# Patient Record
Sex: MALE | Race: WHITE | Employment: FULL TIME | URBAN - METROPOLITAN AREA
[De-identification: names, ages, dates, MRNs, and addresses within clinical notes are randomized per-mention and may not be internally consistent; named-entity substitution may affect disease eponyms.]

---

## 2022-12-16 PROBLEM — M16.12 PRIMARY LOCALIZED OSTEOARTHRITIS OF LEFT HIP: Status: ACTIVE | Noted: 2022-12-16

## 2022-12-16 PROBLEM — M16.12 PRIMARY LOCALIZED OSTEOARTHRITIS OF LEFT HIP: Chronic | Status: ACTIVE | Noted: 2022-12-16

## 2022-12-17 RX ORDER — CEFAZOLIN SODIUM/WATER 2 G/20 ML
2 SYRINGE (ML) INTRAVENOUS EVERY 8 HOURS
Status: CANCELLED | OUTPATIENT
Start: 2022-12-17 | End: 2022-12-18

## 2022-12-17 RX ORDER — SODIUM CHLORIDE 0.9 % (FLUSH) 0.9 %
5-40 SYRINGE (ML) INJECTION AS NEEDED
Status: CANCELLED | OUTPATIENT
Start: 2022-12-17

## 2022-12-17 RX ORDER — SODIUM CHLORIDE 0.9 % (FLUSH) 0.9 %
5-40 SYRINGE (ML) INJECTION EVERY 8 HOURS
Status: CANCELLED | OUTPATIENT
Start: 2022-12-17

## 2022-12-17 RX ORDER — METOCLOPRAMIDE HYDROCHLORIDE 5 MG/ML
10 INJECTION INTRAMUSCULAR; INTRAVENOUS
Status: CANCELLED | OUTPATIENT
Start: 2022-12-17

## 2022-12-17 RX ORDER — SODIUM CHLORIDE 9 MG/ML
300 INJECTION, SOLUTION INTRAVENOUS CONTINUOUS
Status: CANCELLED | OUTPATIENT
Start: 2022-12-17 | End: 2022-12-17

## 2022-12-17 RX ORDER — NALOXONE HYDROCHLORIDE 0.4 MG/ML
0.4 INJECTION, SOLUTION INTRAMUSCULAR; INTRAVENOUS; SUBCUTANEOUS AS NEEDED
Status: CANCELLED | OUTPATIENT
Start: 2022-12-17

## 2022-12-17 RX ORDER — OXYCODONE HYDROCHLORIDE 5 MG/1
10 TABLET ORAL
Status: CANCELLED | OUTPATIENT
Start: 2022-12-17

## 2022-12-17 RX ORDER — ASPIRIN 81 MG/1
81 TABLET ORAL 2 TIMES DAILY
Status: CANCELLED | OUTPATIENT
Start: 2022-12-17

## 2022-12-17 RX ORDER — SODIUM CHLORIDE 9 MG/ML
125 INJECTION, SOLUTION INTRAVENOUS CONTINUOUS
Status: CANCELLED | OUTPATIENT
Start: 2022-12-17 | End: 2022-12-18

## 2022-12-17 RX ORDER — DIPHENHYDRAMINE HYDROCHLORIDE 50 MG/ML
12.5 INJECTION, SOLUTION INTRAMUSCULAR; INTRAVENOUS
Status: CANCELLED | OUTPATIENT
Start: 2022-12-17

## 2022-12-17 RX ORDER — DOCUSATE SODIUM 100 MG/1
100 CAPSULE, LIQUID FILLED ORAL 2 TIMES DAILY
Status: CANCELLED | OUTPATIENT
Start: 2022-12-17

## 2022-12-17 RX ORDER — ACETAMINOPHEN 325 MG/1
650 TABLET ORAL EVERY 6 HOURS
Status: CANCELLED | OUTPATIENT
Start: 2022-12-17

## 2022-12-17 RX ORDER — KETOROLAC TROMETHAMINE 30 MG/ML
15 INJECTION, SOLUTION INTRAMUSCULAR; INTRAVENOUS EVERY 6 HOURS
Status: CANCELLED | OUTPATIENT
Start: 2022-12-17 | End: 2022-12-18

## 2022-12-17 RX ORDER — ONDANSETRON 2 MG/ML
4 INJECTION INTRAMUSCULAR; INTRAVENOUS
Status: CANCELLED | OUTPATIENT
Start: 2022-12-17

## 2022-12-17 RX ORDER — LANOLIN ALCOHOL/MO/W.PET/CERES
1 CREAM (GRAM) TOPICAL 3 TIMES DAILY
Status: CANCELLED | OUTPATIENT
Start: 2022-12-17

## 2022-12-17 NOTE — H&P
[address] [facility] [employer] [facility]  History and Physical Exam    Patient: Merlin Gondola MRN: [id number]  SSN: [id number]    YOB: 1970  Age: 47 y.o. Sex: male      Subjective:      Chief Complaint: Left hip pain    History of Present Illness:  Patient complains of left hip pain and difficulty ambulating, which has progressed over the past several months. X-rays showed osteoarthritis of the joint. The patient's pain has persisted and progressed despite conservative treatments and therapies. The patient has been previously treated with nsaids. The patient has at this time opted for surgical intervention. Past Medical History:   Diagnosis Date    Chronic pain     right hip    Nausea & vomiting     Osteoarthritis     Osteoarthritis of right hip 10/15/2015    Primary localized osteoarthritis of left hip 2022     Past Surgical History:   Procedure Laterality Date    HX HEENT      oral surgery    HX ORTHOPAEDIC      rigth third finger trigger finger release    HX OTHER SURGICAL  1994    Skin graft right arm and bilateral legs       Social History     Occupational History    Not on file   Tobacco Use    Smoking status: Former     Types: Cigarettes     Quit date: 2010     Years since quittin.9    Smokeless tobacco: Never   Substance and Sexual Activity    Alcohol use: Yes     Alcohol/week: 3.0 standard drinks     Types: 3 Cans of beer per week    Drug use: No     Comment: H/O in the marijuana use in the past    Sexual activity: Yes     Partners: Female     Prior to Admission medications    Medication Sig Start Date End Date Taking? Authorizing Provider   oxyCODONE-acetaminophen (PERCOCET) 5-325 mg per tablet Take 1-2 tablets by mouth every 4-6 hours as needed for pain.  10/15/15   Hina Gusman PA       Allergies: No Known Allergies     Review of Systems:  A comprehensive review of systems was negative except for that written in the History of Present Illness. Objective:       Physical Exam:  HEENT: Normocephalic, atraumatic  Lungs:  Clear to auscultation  Heart:   Regular rate and rhythm  Abdomen: Soft  Extremities:  Pain with range of motion of the left hip. Passive flexion  degrees,                       passive internal rotation 0-10 degrees, with pain throughout ROM,                        passive external rotation 10-20 degrees with pain at the arc of motion. Antalgic gait noted. Assessment:      Arthritis of the left hip. Plan:       Proceed with scheduled LEFT TOTAL HIP ARTHROPLASTY. The various methods of treatment have been discussed with the patient and family. After consideration of risks, benefits, and other options for treatment, the patient has consented to surgical interventions. Questions were answered and preoperative teaching was done by Dr Sykler Ortega.      Signed By: ALAINA Yu     December 16, 2022

## 2022-12-17 NOTE — DISCHARGE INSTRUCTIONS
[address] [facility]   Patient Discharge Instructions    Lakeshiaoris Numbers / [id number] : 1970    Admitted (Not on file) Discharged: 2022     IF YOU HAVE ANY PROBLEMS ONCE YOU ARE  [facility]:   Main office number: [phone]    Your follow up appointment to see either Dr. Guille Ramirez PA-C, or [facility] BARBARA as scheduled in 2 weeks. If you are unsure of your appointment date call the office at [phone]. Medication Instructions     Resume your home medictions as directed, you may have directed not to resume supplements until after your follow up. A prescription for pain medication has been given   It is important that you take the medication exactly as they are prescribed. Keep your medication in the bottles provided by the pharmacist and keep a list of the medication names, dosages, and times to be taken in your wallet. Do not take other medications without consulting your doctor. What to do at [address] your prehospital diet. If you have excessive nausea or vomitting call your doctor's office. Be sure to maintain adequate fluid intake. Some pain medications may cause constipation. Remember to drink fluids, stay as active as possible, and eat plenty of fiber-rich foods. Begin In-Home Physical Therapy; 3 times a week to work on gait training, range of motion, strengthening, and weight bearing exercises as tolerable. Continue to use your walker or cane when walking. May progress from the walker to a cane to complete total bearing as tolerable. Patient may shower. Wrap incision with plastic wrap/covering to prevent incision from getting wet. Avoid complete immersion. YOUR DRESSING SHOULD BE CHANGED BY YOUR HOME HEALTH NURSE 5-7 AFTER SURGERY ACCORDING TO THE DATE WRITTEN ON YOUR DRESSING.           When to Call    - Call if you have a temperature greater then 101  - Unable to keep food down  - Are unable to bear any wieght   - Need a pain medication refill     Information obtained by :  I understand that if any problems occur once I am at home I am to contact my physician. I understand and acknowledge receipt of the instructions indicated above. Physician's or R.N.'s Signature                                                                  Date/Time                                                                                                                                              Patient or Representative Signature                                                          Date/Time       DISCHARGE SUMMARY from Nurse    PATIENT INSTRUCTIONS:    After general anesthesia or intravenous sedation, for 24 hours or while taking prescription Narcotics:  Limit your activities  Do not drive and operate hazardous machinery  Do not make important personal or business decisions  Do  not drink alcoholic beverages  If you have not urinated within 8 hours after discharge, please contact your surgeon on call. Report the following to your surgeon:  Excessive pain, swelling, redness or odor of or around the surgical area  Temperature over 100.5  Nausea and vomiting lasting longer than 4 hours or if unable to take medications  Any signs of decreased circulation or nerve impairment to extremity: change in color, persistent  numbness, tingling, coldness or increase pain  Any questions    What to do at Home:  Recommended activity: See surgical instructions,     If you experience any of the following symptoms, fever, chills, foul drainage or uncontrolled pain please follow up with Dr. Wen Meléndez. *  Please give a list of your current medications to your Primary Care Provider.     *  Please update this list whenever your medications are discontinued, doses are      changed, or new medications (including over-the-counter products) are added. *  Please carry medication information at all times in case of emergency situations. These are general instructions for a healthy lifestyle:    No smoking/ No tobacco products/ Avoid exposure to second hand smoke  Surgeon General's Warning:  Quitting smoking now greatly reduces serious risk to your health. Obesity, smoking, and sedentary lifestyle greatly increases your risk for illness    A healthy diet, regular physical exercise & weight monitoring are important for maintaining a healthy lifestyle    You may be retaining fluid if you have a history of heart failure or if you experience any of the following symptoms:  Weight gain of 3 pounds or more overnight or 5 pounds in a week, increased swelling in our hands or feet or shortness of breath while lying flat in bed. Please call your doctor as soon as you notice any of these symptoms; do not wait until your next office visit. Patient armband removed and shredded     The discharge information has been reviewed with the patient and caregiver. The patient and caregiver verbalized understanding. Discharge medications reviewed with the patient and caregiver and appropriate educational materials and side effects teaching were provided.   ___________________________________________________________________________________________________________________________________

## 2022-12-19 ENCOUNTER — HOSPITAL ENCOUNTER (OUTPATIENT)
Dept: PREADMISSION TESTING | Age: 52
Discharge: HOME | End: 2022-12-19

## 2022-12-19 VITALS — WEIGHT: 235 LBS | HEIGHT: 70 IN | BODY MASS INDEX: 33.64 KG/M2

## 2022-12-19 RX ORDER — ALLOPURINOL 100 MG/1
200 TABLET ORAL DAILY
COMMUNITY

## 2022-12-19 RX ORDER — VALACYCLOVIR HYDROCHLORIDE 1 G/1
TABLET, FILM COATED ORAL 3 TIMES DAILY
COMMUNITY
End: 2022-12-19

## 2022-12-19 RX ORDER — SODIUM CHLORIDE, SODIUM LACTATE, POTASSIUM CHLORIDE, CALCIUM CHLORIDE 600; 310; 30; 20 MG/100ML; MG/100ML; MG/100ML; MG/100ML
125 INJECTION, SOLUTION INTRAVENOUS CONTINUOUS
Status: CANCELLED | OUTPATIENT
Start: 2022-12-22

## 2022-12-19 RX ORDER — CEFAZOLIN SODIUM/WATER 2 G/20 ML
2 SYRINGE (ML) INTRAVENOUS
Status: CANCELLED | OUTPATIENT
Start: 2022-12-22 | End: 2022-12-23

## 2022-12-19 RX ORDER — ERGOCALCIFEROL 1.25 MG/1
50000 CAPSULE ORAL
COMMUNITY

## 2022-12-19 RX ORDER — COLCHICINE 0.6 MG/1
0.6 TABLET ORAL 3 TIMES DAILY
COMMUNITY
End: 2022-12-19

## 2022-12-19 RX ORDER — ACETAMINOPHEN 500 MG
1000 TABLET ORAL
COMMUNITY

## 2022-12-19 NOTE — PERIOP NOTES
Anesthesia comments and ekg faxed to [facility] office- MD braun with current EKG? Left message for Hardy Mercado- requested confirmation of receipt of message and EKG.

## 2022-12-19 NOTE — PERIOP NOTES
PAT - SURGICAL PRE-ADMISSION INSTRUCTIONS    NAME:  Neil Nielson                                                          TODAY'S DATE:  12/19/2022    SURGERY DATE:  12/22/2022                                  SURGERY ARRIVAL TIME:   TBD    Do NOT eat or drink anything, including candy or gum, after MIDNIGHT on 12/22/2022 , unless you have specific instructions from your Surgeon or Anesthesia Provider to do so. No smoking 24 hours before surgery. No alcohol 24 hours prior to the day of surgery. No recreational drugs for one week prior to the day of surgery. Leave all valuables, including money/purse, at home. Remove all jewelry, nail polish, makeup (including mascara); no lotions, powders, deodorant, or perfume/cologne/after shave. Glasses/Contact lenses and Dentures may be worn to the hospital.  They will be removed prior to surgery. Call your doctor if symptoms of a cold or illness develop within 24 ours prior to surgery. AN ADULT MUST DRIVE YOU HOME AFTER OUTPATIENT SURGERY. If you are having an OUTPATIENT procedure, please make arrangements for a responsible adult to be with you for 24 hours after your surgery. If you are admitted to the hospital, you will be assigned to a bed after surgery is complete. Normally a family member will not be able to see you until you are in your assigned bed. 12. Visitation Restrictions Explained. Preoperative Nutrition Screen (ERMELINDA)   Patient's Age: 47 y.o. Patient's BMI: Estimated body mass index is 33.72 kg/m² as calculated from the following:    Height as of this encounter: 5' 10\" (1.778 m). Weight as of this encounter: 106.6 kg (235 lb). If the answer to any of the following is Yes, then recommend prescribe Oral Nutrition Supplements (ONS) for at least 7 days prior to surgery and/or order referral to dietitian for further assessment and nutrition therapy. 1. Does the patient have a documented serum albumin less than 3.0 within the last 90 days? No = 0        2. Is patient's BMI less than 18.5 (or less than 20 if age over 72)? No = 0      3. Has the patient had an unplanned weight loss of 10% of body weight or more in the last 6 months? No = 0   4. Has the patient been eating less than 50% of their normal diet in the preceding week? No = 0   ERMELINDA Score (number of Yes responses), 0-4 0     Plan:   No Nutrition Intervention indicated    Pt has an advanced directive, not on file    Special Instructions:  Pt instructed to avoid NSAIDs 7 days prior to procedure per MDA. Pt instructed to hold vitamins/supplements 2 weeks prior to procedure per MDA. Patient Prep:  use CHG solution, pt received, instructions reviewed, start using product tonight. These surgical instructions were reviewed with patient during the PAT phone interview.

## 2022-12-20 NOTE — PERIOP NOTES
Fax and message left for [facility] regarding anesthesia's request regarding PCP okay with EKG

## 2022-12-21 NOTE — PERIOP NOTES
PIERRE for [employer], faxed Kaylynn/Savanna after receiving feedback from DR. Pablo Elizabeth regarding the office notes. There is no mention of the PCP reviewing the EKG in the clearance notes.  Re faxed copy of EKG to Dr. Melissa Sandhu office with request for feedback    1150Pt is cleared at this time by Dr. Kellie Garcia

## 2022-12-21 NOTE — PERIOP NOTES
Spoke with Savanna at Dr. Radha Reyna office.  Faxing copy of the clearance from Dr. Osmany Rodríguez dated after reviewing most recent EKG

## 2022-12-22 ENCOUNTER — APPOINTMENT (OUTPATIENT)
Dept: GENERAL RADIOLOGY | Age: 52
End: 2022-12-22
Attending: PHYSICIAN ASSISTANT
Payer: COMMERCIAL

## 2022-12-22 ENCOUNTER — APPOINTMENT (OUTPATIENT)
Dept: GENERAL RADIOLOGY | Age: 52
End: 2022-12-22
Attending: ORTHOPAEDIC SURGERY
Payer: COMMERCIAL

## 2022-12-22 ENCOUNTER — HOSPITAL ENCOUNTER (OUTPATIENT)
Age: 52
Discharge: HOME HEALTH | End: 2022-12-22
Attending: ORTHOPAEDIC SURGERY | Admitting: ORTHOPAEDIC SURGERY
Payer: COMMERCIAL

## 2022-12-22 ENCOUNTER — ANESTHESIA (OUTPATIENT)
Dept: SURGERY | Age: 52
End: 2022-12-22
Payer: COMMERCIAL

## 2022-12-22 ENCOUNTER — HOME HEALTH ADMISSION (OUTPATIENT)
Dept: HOME HEALTH SERVICES | Facility: HOME HEALTH | Age: 52
End: 2022-12-22
Payer: COMMERCIAL

## 2022-12-22 ENCOUNTER — ANESTHESIA EVENT (OUTPATIENT)
Dept: SURGERY | Age: 52
End: 2022-12-22
Payer: COMMERCIAL

## 2022-12-22 VITALS
HEART RATE: 82 BPM | SYSTOLIC BLOOD PRESSURE: 113 MMHG | HEIGHT: 70 IN | DIASTOLIC BLOOD PRESSURE: 72 MMHG | OXYGEN SATURATION: 94 % | BODY MASS INDEX: 34.72 KG/M2 | TEMPERATURE: 97.6 F | WEIGHT: 242.5 LBS | RESPIRATION RATE: 16 BRPM

## 2022-12-22 DIAGNOSIS — M16.12 PRIMARY LOCALIZED OSTEOARTHRITIS OF LEFT HIP: Primary | Chronic | ICD-10-CM

## 2022-12-22 LAB
ABO + RH BLD: NORMAL
BLOOD GROUP ANTIBODIES SERPL: NORMAL
SPECIMEN EXP DATE BLD: NORMAL

## 2022-12-22 PROCEDURE — 74011250636 HC RX REV CODE- 250/636: Performed by: PHYSICIAN ASSISTANT

## 2022-12-22 PROCEDURE — 74011250636 HC RX REV CODE- 250/636: Performed by: ORTHOPAEDIC SURGERY

## 2022-12-22 PROCEDURE — 74011250636 HC RX REV CODE- 250/636: Performed by: NURSE ANESTHETIST, CERTIFIED REGISTERED

## 2022-12-22 PROCEDURE — 36415 COLL VENOUS BLD VENIPUNCTURE: CPT

## 2022-12-22 PROCEDURE — 74011000250 HC RX REV CODE- 250: Performed by: NURSE ANESTHETIST, CERTIFIED REGISTERED

## 2022-12-22 PROCEDURE — 86900 BLOOD TYPING SEROLOGIC ABO: CPT

## 2022-12-22 PROCEDURE — 77030037713 HC CLOSR DEV INCIS ZIP STRY -B: Performed by: ORTHOPAEDIC SURGERY

## 2022-12-22 PROCEDURE — 97161 PT EVAL LOW COMPLEX 20 MIN: CPT

## 2022-12-22 PROCEDURE — 77030041461 HC RETRCTR GRIPPR KUSA -C: Performed by: ORTHOPAEDIC SURGERY

## 2022-12-22 PROCEDURE — 77030027138 HC INCENT SPIROMETER -A: Performed by: ORTHOPAEDIC SURGERY

## 2022-12-22 PROCEDURE — 74011000250 HC RX REV CODE- 250: Performed by: ORTHOPAEDIC SURGERY

## 2022-12-22 PROCEDURE — 77030032490 HC SLV COMPR SCD KNE COVD -B: Performed by: ORTHOPAEDIC SURGERY

## 2022-12-22 PROCEDURE — 76210000006 HC OR PH I REC 0.5 TO 1 HR: Performed by: ORTHOPAEDIC SURGERY

## 2022-12-22 PROCEDURE — 76060000033 HC ANESTHESIA 1 TO 1.5 HR: Performed by: ORTHOPAEDIC SURGERY

## 2022-12-22 PROCEDURE — 76210000022 HC REC RM PH II 1.5 TO 2 HR: Performed by: ORTHOPAEDIC SURGERY

## 2022-12-22 PROCEDURE — 77030040361 HC SLV COMPR DVT MDII -B: Performed by: ORTHOPAEDIC SURGERY

## 2022-12-22 PROCEDURE — C1776 JOINT DEVICE (IMPLANTABLE): HCPCS | Performed by: ORTHOPAEDIC SURGERY

## 2022-12-22 PROCEDURE — 77030012712 HC WND ARTHRO ABLT S&N -E: Performed by: ORTHOPAEDIC SURGERY

## 2022-12-22 PROCEDURE — 77030006835 HC BLD SAW SAG STRY -B: Performed by: ORTHOPAEDIC SURGERY

## 2022-12-22 PROCEDURE — 77030041075 HC DRSG AG OPTIFRM MDII -B: Performed by: ORTHOPAEDIC SURGERY

## 2022-12-22 PROCEDURE — 77030003666 HC NDL SPINAL BD -A: Performed by: ORTHOPAEDIC SURGERY

## 2022-12-22 PROCEDURE — 76010000149 HC OR TIME 1 TO 1.5 HR: Performed by: ORTHOPAEDIC SURGERY

## 2022-12-22 PROCEDURE — 74011250637 HC RX REV CODE- 250/637: Performed by: ANESTHESIOLOGY

## 2022-12-22 PROCEDURE — 77030013708 HC HNDPC SUC IRR PULS STRY –B: Performed by: ORTHOPAEDIC SURGERY

## 2022-12-22 PROCEDURE — 73501 X-RAY EXAM HIP UNI 1 VIEW: CPT

## 2022-12-22 PROCEDURE — 2709999900 HC NON-CHARGEABLE SUPPLY: Performed by: ORTHOPAEDIC SURGERY

## 2022-12-22 PROCEDURE — 77030012508 HC MSK AIRWY LMA AMBU -A: Performed by: ANESTHESIOLOGY

## 2022-12-22 PROCEDURE — 74011250636 HC RX REV CODE- 250/636: Performed by: ANESTHESIOLOGY

## 2022-12-22 PROCEDURE — 74011250637 HC RX REV CODE- 250/637: Performed by: PHYSICIAN ASSISTANT

## 2022-12-22 PROCEDURE — 77030020782 HC GWN BAIR PAWS FLX 3M -B: Performed by: ORTHOPAEDIC SURGERY

## 2022-12-22 PROCEDURE — 77030031139 HC SUT VCRL2 J&J -A: Performed by: ORTHOPAEDIC SURGERY

## 2022-12-22 PROCEDURE — 77030011264 HC ELECTRD BLD EXT COVD -A: Performed by: ORTHOPAEDIC SURGERY

## 2022-12-22 DEVICE — IMPLANTABLE DEVICE
Type: IMPLANTABLE DEVICE | Site: HIP | Status: FUNCTIONAL
Brand: SPARTAN HIP STEM

## 2022-12-22 DEVICE — IMPLANTABLE DEVICE
Type: IMPLANTABLE DEVICE | Site: HIP | Status: FUNCTIONAL
Brand: LOGICAL G-SERIES

## 2022-12-22 DEVICE — IMPLANTABLE DEVICE
Type: IMPLANTABLE DEVICE | Site: HIP | Status: FUNCTIONAL
Brand: LOGICAL

## 2022-12-22 DEVICE — IMPLANTABLE DEVICE
Type: IMPLANTABLE DEVICE | Site: HIP | Status: FUNCTIONAL
Brand: SIGNATURE FEMORAL HEAD

## 2022-12-22 RX ORDER — EPHEDRINE SULFATE/0.9% NACL/PF 50 MG/5 ML
SYRINGE (ML) INTRAVENOUS AS NEEDED
Status: DISCONTINUED | OUTPATIENT
Start: 2022-12-22 | End: 2022-12-22 | Stop reason: HOSPADM

## 2022-12-22 RX ORDER — GUAIFENESIN 100 MG/5ML
81 LIQUID (ML) ORAL 2 TIMES DAILY
Qty: 42 TABLET | Refills: 0 | Status: SHIPPED | OUTPATIENT
Start: 2022-12-22 | End: 2023-01-12

## 2022-12-22 RX ORDER — FENTANYL CITRATE 50 UG/ML
INJECTION, SOLUTION INTRAMUSCULAR; INTRAVENOUS AS NEEDED
Status: DISCONTINUED | OUTPATIENT
Start: 2022-12-22 | End: 2022-12-22 | Stop reason: HOSPADM

## 2022-12-22 RX ORDER — PANTOPRAZOLE SODIUM 40 MG/1
40 TABLET, DELAYED RELEASE ORAL ONCE
Status: COMPLETED | OUTPATIENT
Start: 2022-12-22 | End: 2022-12-22

## 2022-12-22 RX ORDER — OXYCODONE HYDROCHLORIDE 5 MG/1
5 TABLET ORAL
Status: DISCONTINUED | OUTPATIENT
Start: 2022-12-22 | End: 2022-12-22 | Stop reason: HOSPADM

## 2022-12-22 RX ORDER — SCOLOPAMINE TRANSDERMAL SYSTEM 1 MG/1
1 PATCH, EXTENDED RELEASE TRANSDERMAL
Status: DISCONTINUED | OUTPATIENT
Start: 2022-12-22 | End: 2022-12-22 | Stop reason: HOSPADM

## 2022-12-22 RX ORDER — OXYCODONE HYDROCHLORIDE 5 MG/1
5 TABLET ORAL
Qty: 60 TABLET | Refills: 0 | Status: SHIPPED | OUTPATIENT
Start: 2022-12-22 | End: 2022-12-29

## 2022-12-22 RX ORDER — PROPOFOL 10 MG/ML
INJECTION, EMULSION INTRAVENOUS AS NEEDED
Status: DISCONTINUED | OUTPATIENT
Start: 2022-12-22 | End: 2022-12-22 | Stop reason: HOSPADM

## 2022-12-22 RX ORDER — HYDROMORPHONE HYDROCHLORIDE 1 MG/ML
INJECTION, SOLUTION INTRAMUSCULAR; INTRAVENOUS; SUBCUTANEOUS AS NEEDED
Status: DISCONTINUED | OUTPATIENT
Start: 2022-12-22 | End: 2022-12-22 | Stop reason: HOSPADM

## 2022-12-22 RX ORDER — PREGABALIN 50 MG/1
50 CAPSULE ORAL
Status: COMPLETED | OUTPATIENT
Start: 2022-12-22 | End: 2022-12-22

## 2022-12-22 RX ORDER — MELOXICAM 7.5 MG/1
7.5 TABLET ORAL 2 TIMES DAILY
Qty: 28 TABLET | Refills: 0 | Status: SHIPPED | OUTPATIENT
Start: 2022-12-22 | End: 2023-01-05

## 2022-12-22 RX ORDER — DEXAMETHASONE SODIUM PHOSPHATE 4 MG/ML
INJECTION, SOLUTION INTRA-ARTICULAR; INTRALESIONAL; INTRAMUSCULAR; INTRAVENOUS; SOFT TISSUE AS NEEDED
Status: DISCONTINUED | OUTPATIENT
Start: 2022-12-22 | End: 2022-12-22 | Stop reason: HOSPADM

## 2022-12-22 RX ORDER — ONDANSETRON 2 MG/ML
4 INJECTION INTRAMUSCULAR; INTRAVENOUS ONCE
Status: DISCONTINUED | OUTPATIENT
Start: 2022-12-22 | End: 2022-12-22 | Stop reason: HOSPADM

## 2022-12-22 RX ORDER — SODIUM CHLORIDE, SODIUM LACTATE, POTASSIUM CHLORIDE, CALCIUM CHLORIDE 600; 310; 30; 20 MG/100ML; MG/100ML; MG/100ML; MG/100ML
INJECTION, SOLUTION INTRAVENOUS
Status: DISCONTINUED | OUTPATIENT
Start: 2022-12-22 | End: 2022-12-22 | Stop reason: HOSPADM

## 2022-12-22 RX ORDER — CEFAZOLIN SODIUM/WATER 2 G/20 ML
2 SYRINGE (ML) INTRAVENOUS
Status: COMPLETED | OUTPATIENT
Start: 2022-12-22 | End: 2022-12-22

## 2022-12-22 RX ORDER — MIDAZOLAM HYDROCHLORIDE 1 MG/ML
INJECTION, SOLUTION INTRAMUSCULAR; INTRAVENOUS AS NEEDED
Status: DISCONTINUED | OUTPATIENT
Start: 2022-12-22 | End: 2022-12-22 | Stop reason: HOSPADM

## 2022-12-22 RX ORDER — LIDOCAINE HYDROCHLORIDE 20 MG/ML
INJECTION, SOLUTION EPIDURAL; INFILTRATION; INTRACAUDAL; PERINEURAL AS NEEDED
Status: DISCONTINUED | OUTPATIENT
Start: 2022-12-22 | End: 2022-12-22 | Stop reason: HOSPADM

## 2022-12-22 RX ORDER — CEFADROXIL 500 MG/1
500 CAPSULE ORAL 2 TIMES DAILY
Qty: 10 CAPSULE | Refills: 0 | Status: SHIPPED | OUTPATIENT
Start: 2022-12-22 | End: 2022-12-27

## 2022-12-22 RX ORDER — FLUMAZENIL 0.1 MG/ML
0.2 INJECTION INTRAVENOUS
Status: DISCONTINUED | OUTPATIENT
Start: 2022-12-22 | End: 2022-12-22 | Stop reason: HOSPADM

## 2022-12-22 RX ORDER — SODIUM CHLORIDE, SODIUM LACTATE, POTASSIUM CHLORIDE, CALCIUM CHLORIDE 600; 310; 30; 20 MG/100ML; MG/100ML; MG/100ML; MG/100ML
100 INJECTION, SOLUTION INTRAVENOUS CONTINUOUS
Status: DISCONTINUED | OUTPATIENT
Start: 2022-12-22 | End: 2022-12-22 | Stop reason: HOSPADM

## 2022-12-22 RX ORDER — FENTANYL CITRATE 50 UG/ML
50 INJECTION, SOLUTION INTRAMUSCULAR; INTRAVENOUS
Status: DISCONTINUED | OUTPATIENT
Start: 2022-12-22 | End: 2022-12-22 | Stop reason: HOSPADM

## 2022-12-22 RX ORDER — TRANEXAMIC ACID 650 MG/1
1950 TABLET ORAL ONCE
Status: COMPLETED | OUTPATIENT
Start: 2022-12-22 | End: 2022-12-22

## 2022-12-22 RX ORDER — OXYCODONE AND ACETAMINOPHEN 5; 325 MG/1; MG/1
1 TABLET ORAL
Status: DISCONTINUED | OUTPATIENT
Start: 2022-12-22 | End: 2022-12-22 | Stop reason: HOSPADM

## 2022-12-22 RX ORDER — NALOXONE HYDROCHLORIDE 0.4 MG/ML
0.4 INJECTION, SOLUTION INTRAMUSCULAR; INTRAVENOUS; SUBCUTANEOUS AS NEEDED
Status: DISCONTINUED | OUTPATIENT
Start: 2022-12-22 | End: 2022-12-22 | Stop reason: HOSPADM

## 2022-12-22 RX ORDER — DEXAMETHASONE SODIUM PHOSPHATE 4 MG/ML
8 INJECTION, SOLUTION INTRA-ARTICULAR; INTRALESIONAL; INTRAMUSCULAR; INTRAVENOUS; SOFT TISSUE SEE ADMIN INSTRUCTIONS
Status: COMPLETED | OUTPATIENT
Start: 2022-12-22 | End: 2022-12-22

## 2022-12-22 RX ORDER — ACETAMINOPHEN 500 MG
1000 TABLET ORAL ONCE
Status: COMPLETED | OUTPATIENT
Start: 2022-12-22 | End: 2022-12-22

## 2022-12-22 RX ORDER — ONDANSETRON 2 MG/ML
INJECTION INTRAMUSCULAR; INTRAVENOUS AS NEEDED
Status: DISCONTINUED | OUTPATIENT
Start: 2022-12-22 | End: 2022-12-22 | Stop reason: HOSPADM

## 2022-12-22 RX ORDER — KETAMINE HCL IN 0.9 % NACL 50 MG/5 ML
SYRINGE (ML) INTRAVENOUS AS NEEDED
Status: DISCONTINUED | OUTPATIENT
Start: 2022-12-22 | End: 2022-12-22 | Stop reason: HOSPADM

## 2022-12-22 RX ORDER — SODIUM CHLORIDE, SODIUM LACTATE, POTASSIUM CHLORIDE, CALCIUM CHLORIDE 600; 310; 30; 20 MG/100ML; MG/100ML; MG/100ML; MG/100ML
125 INJECTION, SOLUTION INTRAVENOUS CONTINUOUS
Status: DISCONTINUED | OUTPATIENT
Start: 2022-12-22 | End: 2022-12-22 | Stop reason: HOSPADM

## 2022-12-22 RX ORDER — CELECOXIB 100 MG/1
400 CAPSULE ORAL
Status: COMPLETED | OUTPATIENT
Start: 2022-12-22 | End: 2022-12-22

## 2022-12-22 RX ORDER — HYDROMORPHONE HYDROCHLORIDE 1 MG/ML
0.5 INJECTION, SOLUTION INTRAMUSCULAR; INTRAVENOUS; SUBCUTANEOUS
Status: DISCONTINUED | OUTPATIENT
Start: 2022-12-22 | End: 2022-12-22 | Stop reason: HOSPADM

## 2022-12-22 RX ADMIN — PANTOPRAZOLE SODIUM 40 MG: 40 TABLET, DELAYED RELEASE ORAL at 07:23

## 2022-12-22 RX ADMIN — Medication 10 MG: at 09:20

## 2022-12-22 RX ADMIN — HYDROMORPHONE HYDROCHLORIDE 0.5 MG: 1 INJECTION, SOLUTION INTRAMUSCULAR; INTRAVENOUS; SUBCUTANEOUS at 09:42

## 2022-12-22 RX ADMIN — HYDROMORPHONE HYDROCHLORIDE 0.5 MG: 1 INJECTION, SOLUTION INTRAMUSCULAR; INTRAVENOUS; SUBCUTANEOUS at 09:35

## 2022-12-22 RX ADMIN — HYDROMORPHONE HYDROCHLORIDE 1 MG: 1 INJECTION, SOLUTION INTRAMUSCULAR; INTRAVENOUS; SUBCUTANEOUS at 09:08

## 2022-12-22 RX ADMIN — LIDOCAINE HYDROCHLORIDE 40 MG: 20 INJECTION, SOLUTION EPIDURAL; INFILTRATION; INTRACAUDAL; PERINEURAL at 09:01

## 2022-12-22 RX ADMIN — CELECOXIB 400 MG: 100 CAPSULE ORAL at 08:15

## 2022-12-22 RX ADMIN — DEXAMETHASONE SODIUM PHOSPHATE 8 MG: 4 INJECTION, SOLUTION INTRAMUSCULAR; INTRAVENOUS at 10:48

## 2022-12-22 RX ADMIN — FENTANYL CITRATE 50 MCG: 50 INJECTION, SOLUTION INTRAMUSCULAR; INTRAVENOUS at 09:23

## 2022-12-22 RX ADMIN — Medication 10 MG: at 09:15

## 2022-12-22 RX ADMIN — Medication 30 MG: at 09:22

## 2022-12-22 RX ADMIN — ACETAMINOPHEN 1000 MG: 500 TABLET ORAL at 07:23

## 2022-12-22 RX ADMIN — PROPOFOL 180 MG: 10 INJECTION, EMULSION INTRAVENOUS at 09:01

## 2022-12-22 RX ADMIN — FENTANYL CITRATE 50 MCG: 50 INJECTION, SOLUTION INTRAMUSCULAR; INTRAVENOUS at 11:11

## 2022-12-22 RX ADMIN — ONDANSETRON HYDROCHLORIDE 4 MG: 2 INJECTION INTRAMUSCULAR; INTRAVENOUS at 09:08

## 2022-12-22 RX ADMIN — DEXAMETHASONE SODIUM PHOSPHATE 8 MG: 4 INJECTION, SOLUTION INTRAMUSCULAR; INTRAVENOUS at 07:39

## 2022-12-22 RX ADMIN — PREGABALIN 50 MG: 50 CAPSULE ORAL at 08:15

## 2022-12-22 RX ADMIN — FENTANYL CITRATE 50 MCG: 50 INJECTION, SOLUTION INTRAMUSCULAR; INTRAVENOUS at 09:28

## 2022-12-22 RX ADMIN — FENTANYL CITRATE 50 MCG: 50 INJECTION, SOLUTION INTRAMUSCULAR; INTRAVENOUS at 09:50

## 2022-12-22 RX ADMIN — MIDAZOLAM 2 MG: 1 INJECTION INTRAMUSCULAR; INTRAVENOUS at 08:53

## 2022-12-22 RX ADMIN — LIDOCAINE HYDROCHLORIDE 100 MG: 20 INJECTION, SOLUTION EPIDURAL; INFILTRATION; INTRACAUDAL; PERINEURAL at 09:33

## 2022-12-22 RX ADMIN — SODIUM CHLORIDE, SODIUM LACTATE, POTASSIUM CHLORIDE, AND CALCIUM CHLORIDE: 600; 310; 30; 20 INJECTION, SOLUTION INTRAVENOUS at 08:52

## 2022-12-22 RX ADMIN — FENTANYL CITRATE 50 MCG: 50 INJECTION, SOLUTION INTRAMUSCULAR; INTRAVENOUS at 09:53

## 2022-12-22 RX ADMIN — SODIUM CHLORIDE, SODIUM LACTATE, POTASSIUM CHLORIDE, AND CALCIUM CHLORIDE 125 ML/HR: 600; 310; 30; 20 INJECTION, SOLUTION INTRAVENOUS at 07:39

## 2022-12-22 RX ADMIN — SODIUM CHLORIDE, SODIUM LACTATE, POTASSIUM CHLORIDE, AND CALCIUM CHLORIDE 1000 ML: 600; 310; 30; 20 INJECTION, SOLUTION INTRAVENOUS at 07:39

## 2022-12-22 RX ADMIN — TRANEXAMIC ACID 1950 MG: 650 TABLET ORAL at 07:04

## 2022-12-22 RX ADMIN — Medication 2 G: at 09:05

## 2022-12-22 RX ADMIN — SODIUM CHLORIDE, SODIUM LACTATE, POTASSIUM CHLORIDE, AND CALCIUM CHLORIDE: 600; 310; 30; 20 INJECTION, SOLUTION INTRAVENOUS at 10:12

## 2022-12-22 RX ADMIN — DEXAMETHASONE SODIUM PHOSPHATE 4 MG: 4 INJECTION, SOLUTION INTRAMUSCULAR; INTRAVENOUS at 09:08

## 2022-12-22 NOTE — ANESTHESIA POSTPROCEDURE EVALUATION
Post-Anesthesia Evaluation and Assessment    Cardiovascular Function/Vital Signs  Visit Vitals  /72   Pulse 82   Temp 36.4 °C (97.6 °F)   Resp 16   Ht 5' 10\" (1.778 m)   Wt 110 kg (242 lb 8 oz)   SpO2 94%   BMI 34.80 kg/m²       Patient is status post Procedure(s):  LEFT TOTAL HIP REPLACEMENT ANTERIOR APPROACH WITH C-ARM. Nausea/Vomiting: Controlled. Postoperative hydration reviewed and adequate. Pain:  Pain Scale 1: Numeric (0 - 10) (12/22/22 1326)  Pain Intensity 1: 0 (12/22/22 1326)   Managed. Neurological Status:   Neuro (WDL): Within Defined Limits (12/22/22 1326)   At baseline. Mental Status and Level of Consciousness: Arousable. Pulmonary Status:   O2 Device: None (Room air) (12/22/22 1326)   Adequate oxygenation and airway patent. Complications related to anesthesia: None    Patient has met all discharge requirements.     Signed By: Gilberto Vicente MD    December 22, 2022

## 2022-12-22 NOTE — PERIOP NOTES
TRANSFER - OUT REPORT:    Verbal report given to Tavon Carpenter RN on Ashok Christine.  being transferred to OR (unit) for routine post - op       Report consisted of patients Situation, Background, Assessment and   Recommendations(SBAR). Information from the following report(s) SBAR was reviewed with the receiving nurse. Lines:   Peripheral IV 12/22/22 Left;Posterior Hand (Active)   Site Assessment Clean, dry, & intact 12/22/22 1125   Phlebitis Assessment 0 12/22/22 1125   Infiltration Assessment 0 12/22/22 1125   Dressing Status Clean, dry, & intact 12/22/22 1125   Dressing Type Tape;Transparent 12/22/22 1125   Hub Color/Line Status Infusing;Patent;Green 12/22/22 1125        Opportunity for questions and clarification was provided.       Patient transported with:   Registered Nurse

## 2022-12-22 NOTE — PERIOP NOTES
TRANSFER - IN REPORT:    Verbal report received from ORN & CRNA on Stiven Barakat.  being received from OR (unit) for routine post - op      Report consisted of patients Situation, Background, Assessment and   Recommendations(SBAR). Information from the following report(s) SBAR was reviewed with the receiving nurse. Opportunity for questions and clarification was provided. Assessment completed upon patients arrival to unit and care assumed.

## 2022-12-22 NOTE — OP NOTES
[address] [facility] [address]  Total Hip Arthroplasty      Patient: Ricci Jacinto. MRN: [id number]  SSN: [id number]    YOB: 1970  Age: 47 y.o. Sex: male      Date of Surgery: 12/22/2022   Preoperative Diagnosis: LEFT HIP OSTEOARTHRITIS   Postoperative Diagnosis: LEFT HIP OSTEOARTHRITIS   Location: [facility]  Surgeon: Shelia Mota MD  [facility]    Anesthesia: general    Procedure: Total Left Hip Arthroplasty    Findings: Degenerative joint disease of the left hip. Estimated Blood Loss: 300ml    Specimens: None    Complications: none    Implants:   Implant Name Type Inv. Item Serial No.  Lot No. LRB No. Used Action   SPARTAN HIP STEM    [employer] [id number] Left 1 Implanted   CERAMIC FEMORAL HEAD    [employer] [id number] Left 1 Implanted   SHELL ACET 3 HOLE 56 MM HIP LOGICAL G-SERIES - [id number]  SHELL ACET 3 HOLE 56 MM HIP LOGICAL G-SERIES  [employer] [id number] Left 1 Implanted   SHELL ACET 3 HOLE 56 MM HIP LOGICAL G-SERIES - [id number]  SHELL ACET 3 HOLE 56 MM HIP LOGICAL G-SERIES  [employer] [id number] Left 1 Implanted       Procedure Detail:  After the patient was brought to the operating suite, He was effectively anesthetized using general anesthesia, then transferred to the Baldwin table and secured in a standard fashion. His left hip was then prepped and draped in a normal sterile orthopedic fashion. He was given appropriate intravenous antibiotics preoperatively. After a proper timeout was performed, a direct anterior approach to the hip was performed using a short Proctor-[place]erick interval. Anterior capsulotomy was performed. The degenerative changes of the hip were noted. Femoral neck osteotomy was then performed to the templated area. The head and neck were removed. The pulvinar and labrum were excised.  The acetabulum was then reamed up to 56 mm with good bleeding cancellous bone obtained. The cup was then irrigated with pulse lavage system. A 56 mm Signature Logical G cup was then impacted in place with excellent stable fixation obtained, placing the cup at about 45 degrees of abduction, 20 degrees of anteversion. The liner was then impacted in place. A screw was not placed. Attention was turned to the femur, which was delivered into the wound with a combination of extension, external rotation, and adduction, and using the hook on the [place] table to deliver the femur into the wound. The canal was broached up to a size 5 for the Spartan stem system with excellent stable fixation obtained. A trial reduction was then performed with the standard neck offset and 36 mm head balls with various neck lengths. With the +0, he appeared to have equalization of leg lengths and restoration of offset radiographically using the c-arm and joint point navigation system, and excellent functional stability was noted. The trial broach was removed. The canal was irrigated with the pulse lavage system. The final components were impacted in place with excellent stable fixation obtained once again. The final reduction was performed and once again leg lengths and offset were restored radiographically, using the C-arm radiographically intraoperatively, and excellent functional stability was noted. The wound was then irrigated one more time, and then closed in layers. The fascia of the tensor was closed with #1 Vicryl in a running type stitch. Subcutaneous tissue was closed with 2-0 Vicryl in a simple buried stitch, and the skin was closed with Prineo. Dry, sterile dressing was then applied. He tolerated this well, was transferred to the bed, and taken to recovery room, extubated, in stable condition. All sponge and needle counts were correct. Signed By: Cyndy Lutz MD     December 22, 2022           Operative Note    Patient: Josiah Espinoza.   YOB: 1970  MRN: [id number]    Date of Procedure: 12/22/2022     Pre-Op Diagnosis: LEFT HIP OSTEOARTHRITIS    Post-Op Diagnosis: Same as preoperative diagnosis. Procedure(s):  LEFT TOTAL HIP REPLACEMENT ANTERIOR APPROACH WITH C-ARM    Surgeon(s):  Anselmo Schaefer MD    Surgical Assistant: Physician Assistant: ALAINA Spear    Anesthesia: General     Estimated Blood Loss (mL):  204     Complications: None    Specimens: * No specimens in log *     Implants:   Implant Name Type Inv.  Item Serial No.  Lot No. LRB No. Used Action   SPARTAN HIP STEM    [employer] [id number] Left 1 Implanted   CERAMIC FEMORAL HEAD    [employer] [id number] Left 1 Implanted   SHELL ACET 3 HOLE 56 MM HIP LOGICAL G-SERIES - [id number]  SHELL ACET 3 HOLE 56 MM HIP LOGICAL G-SERIES  [employer] [id number] Left 1 Implanted   SHELL ACET 3 HOLE 64 MM HIP LOGICAL G-SERIES - [id number]  SHELL ACET 3 HOLE 56 MM HIP LOGICAL G-SERIES  [employer] [id number] Left 1 Implanted       Drains: * No LDAs found *    Findings: djd hip    Detailed Description of Procedure:   See above note    Electronically Signed by Elyse Stokes MD on 12/22/2022 at 12:14 PM

## 2022-12-22 NOTE — PROGRESS NOTES
Problem: Mobility Impaired (Adult and Pediatric)  Goal: *Acute Goals and Plan of Care (Insert Text)  Description: Goals to be addressed in 1-3 days:  1. Supine to sit and sit to supine SBA with HR for meals. 2. Sit to stand and stand to sit SBA/CGA with RW in prep for ambulation. 3. Ambulate 100ft SBA/CGA with RW, WBAT, for home/community mobility. 4. Ascend/descend a 4 stair steps CGA/[place] with HR for home entry. Note: [x]  Patient has met MD miguel diamond for d/c home   [x]  Recommend HH with 24 hour adult care   []  Benefit from additional acute PT session to address:      PHYSICAL THERAPY EVALUATION    Patient: Henry Lr (47 y.o. male)  Date: 12/22/2022  Primary Diagnosis: Primary localized osteoarthritis of left hip [M16.12]  Procedure(s) (LRB):  LEFT TOTAL HIP REPLACEMENT ANTERIOR APPROACH WITH C-ARM (Left) Day of Surgery   Precautions:  Fall, WBAT    ASSESSMENT :  Based on the objective data described below, the patient presents with lower extremity weakness, decreased gait quality and endurance, impaired bed mobility and transfers, decreased L hip ROM/flexibility, and overall limitations in functional mobility s/p L KAREN. Pt performed sit to stand with CGA. Patient ambulated 100 feet with RW, GB applied, CGA. Pt negotiated 5 stairs with handrails and CGA. Pt educated on icing, elevation, positioning, home safety, home exercise program, and activity recommendations. Home health physical therapy is recommended upon discharge from hospital. Recommend 24 hour supervision/caregiver assistance upon d/c. Patient will benefit from skilled intervention to address the above impairments.   Patient's rehabilitation potential is considered to be Good  Factors which may influence rehabilitation potential include:   [x]         None noted  []         Mental ability/status  []         Medical condition  []         Home/family situation and support systems  []         Safety awareness  [] Pain tolerance/management  []         Other:      PLAN :  Recommendations and Planned Interventions:   [x]           Bed Mobility Training             []    Neuromuscular Re-Education  [x]           Transfer Training                   []    Orthotic/Prosthetic Training  [x]           Gait Training                          [x]    Modalities  [x]           Therapeutic Exercises           [x]    Edema Management/Control  [x]           Therapeutic Activities            [x]    Family Training/Education  [x]           Patient Education  []           Other (comment):    Frequency/Duration: Patient will be followed by physical therapy 1-2 times per day/4-7 days per week to address goals. Discharge Recommendations: Home Health  Further Equipment Recommendations for Discharge: N/A    AMPAC: 18/24    This AMPAC score should be considered in conjunction with interdisciplinary team recommendations to determine the most appropriate discharge setting. Patient's social support, diagnosis, medical stability, and prior level of function should also be taken into consideration. SUBJECTIVE:   Patient stated I am ready.     OBJECTIVE DATA SUMMARY:     Past Medical History:   Diagnosis Date    Arthritis     OA    Gout     High cholesterol     History of COVID-19 09/04/2021    Nausea & vomiting     required antiemetic drugs with past procedures    Sleep apnea     has cpap, not using     Past Surgical History:   Procedure Laterality Date    HX HEENT      oral surgery    HX HERNIA REPAIR      double hernia repair, infant    HX HIP REPLACEMENT Right 10/15/2015    by Dr. Jovana Alexander Left 2019    HX ORTHOPAEDIC      right third finger trigger finger release    HX OTHER SURGICAL  01/01/1994    Skin graft right arm and bilateral legs       Barriers to Learning/Limitations: None  Compensate with: Visual Cues and Verbal Cues  PLOF: Independent ambulation without AD  Home Situation:  Home Situation  Home Environment: Private residence  # Steps to Enter: 4  Rails to Enter: Yes  Hand Rails : Bilateral  One/Two Story Residence: Two story  # of Interior Steps: 15  Interior Rails: Both  Lift Chair Available: No  Living Alone: No  Support Systems: Spouse/Significant Other  Patient Expects to be Discharged to[de-identified] Home with family assistance  Current DME Used/Available at Home: Walker, rolling  Strength:    Strength: Generally decreased, functional  Tone & Sensation:   Tone: Normal  Sensation: Impaired  Range Of Motion:  AROM: Generally decreased, functional  PROM: Generally decreased, functional  Functional Mobility:  Transfers:  Sit to Stand: Contact guard assistance  Stand to Sit: Contact guard assistance  Balance:   Sitting: Intact  Standing: Intact; With support  Ambulation/Gait Training:  Distance (ft): 100 Feet (ft)  Assistive Device: Gait belt;Walker, rolling  Ambulation - Level of Assistance: Contact guard assistance   Gait Description (WDL): Exceptions to WDL  Gait Abnormalities: Decreased step clearance; Antalgic   Left Side Weight Bearing: As tolerated  Base of Support: Shift to right  Stance: Left decreased  Speed/Jessica: Slow  Step Length: Right shortened;Left shortened  Stairs:  Number of Stairs Trained: 5  Stairs - Level of Assistance: Contact guard assistance  Rail Use: Both  Pain:  Pain level pre-treatment: 5/10   Pain level post-treatment: 5/10   Pain Intervention(s) : Medication (see MAR); Rest, Ice, Repositioning  Response to intervention: Nurse notified, See doc flow    Activity Tolerance:   Good  Please refer to the flowsheet for vital signs taken during this treatment.   After treatment:   [x]         Patient left in no apparent distress sitting up in chair  []         Patient left in no apparent distress in bed  [x]         Call bell left within reach  [x]         Nursing notified  []         Caregiver present  []         Bed alarm activated  []         SCDs applied    COMMUNICATION/EDUCATION:   [x]         Role of Physical Therapy in the acute care setting. [x]         Fall prevention education was provided and the patient/caregiver indicated understanding. [x]         Patient/family have participated as able in goal setting and plan of care. [x]         Patient/family agree to work toward stated goals and plan of care. []         Patient understands intent and goals of therapy, but is neutral about his/her participation. []         Patient is unable to participate in goal setting/plan of care: ongoing with therapy staff.  []         Other: Thank you for this referral.  Tyler Morneo   Time Calculation: 12 mins      Eval Complexity: History: MEDIUM  Complexity : 1-2 comorbidities / personal factors will impact the outcome/ POC Exam:LOW Complexity : 1-2 Standardized tests and measures addressing body structure, function, activity limitation and / or participation in recreation  Presentation: LOW Complexity : Stable, uncomplicated  Clinical Decision Making:Low Complexity  Overall Complexity:LOW        [facility] AM-PAC® Basic Mobility Inpatient Short Form (6-Clicks) Version 2    How much HELP from another person does the patient currently need    (If the patient hasn't done an activity recently, how much help from another person do you think he/she would need if he/she tried?)   Total (Total A or Dep)   A Lot  (Mod to Max A)   A Little (Sup or Min A)   None (Mod I to I)   Turning from your back to your side while in a flat bed without using bedrails? [] 1 [] 2 [x] 3 [] 4   2. Moving from lying on your back to sitting on the side of a flat bed without using bedrails? [] 1 [] 2 [x] 3 [] 4   3. Moving to and from a bed to a chair (including a wheelchair)? [] 1 [] 2 [x] 3 [] 4   4. Standing up from a chair using your arms (e.g., wheelchair, or bedside chair)? [] 1 [] 2 [x] 3 [] 4   5. Walking in hospital room? [] 1 [] 2 [x] 3 [] 4   6.  Climbing 3-5 steps with a railing?+   [] 1 [] 2 [x] 3 [] 4   +If stair climbing cannot be assessed, skip item #6. Sum responses from items 1-5. Based on an AM-PAC score of 18/24 and their current functional mobility deficits, it is recommended that the patient have 2-3 sessions per week of Physical Therapy at d/c to increase the patient's independence.

## 2022-12-22 NOTE — DISCHARGE SUMMARY
[address] [facility] [address] [facility] [place] [address]     DISCHARGE SUMMARY     PATIENT: Sascha Soriano. MRN: [id number]   ADMIT DATE: 2022   BILLIN   DISCHARGE DATE: 2022     ATTENDING: Raguel Sandhoff, MD   DICTATING: ALAINA Armendariz     ADMISSION DIAGNOSIS: Primary localized osteoarthritis of left hip [M16.12]    DISCHARGE DIAGNOSIS: Status post LEFT TOTAL HIP ARTHROPLASTY    HISTORY OF PRESENT ILLNESS: The patient is a 47y.o. year-old male   with ongoing left hip pain secondary to osteoarthritis. The patient's pain has persisted and progressed despite conservative treatments and therapies. The patient has at this time opted for surgical intervention. PAST MEDICAL HISTORY:   Past Medical History:   Diagnosis Date    Arthritis     OA    Gout     High cholesterol     History of COVID-19 2021    Nausea & vomiting     required antiemetic drugs with past procedures    Sleep apnea     has cpap, not using       PAST SURGICAL HISTORY:   Past Surgical History:   Procedure Laterality Date    HX HEENT      oral surgery    HX HERNIA REPAIR      double hernia repair, infant    HX HIP REPLACEMENT Right 10/15/2015    by Dr. Ramirez Limb Left 2019    HX ORTHOPAEDIC      right third finger trigger finger release    HX OTHER SURGICAL  1994    Skin graft right arm and bilateral legs         ALLERGIES: No Known Allergies     CURRENT MEDICATIONS:  A list of medications prior to the time of admission include:  Prior to Admission medications    Medication Sig Start Date End Date Taking? Authorizing Provider   aspirin 81 mg chewable tablet Take 1 Tablet by mouth two (2) times a day for 21 days. 22 Yes Maurice Cadena PA   cefadroxil (DURICEF) 500 mg capsule Take 1 Capsule by mouth two (2) times a day for 5 days.  22 Yes Maurice Cadena PA   meloxicam (MOBIC) 7.5 mg tablet Take 1 Tablet by mouth two (2) times a day for 14 days. 22 Yes Maurice Cadena PA   oxyCODONE IR (ROXICODONE) 5 mg immediate release tablet Take 1 Tablet by mouth every four (4) hours as needed for Pain for up to 7 days. Max Daily Amount: 30 mg. 22 Yes Maurice Cadena PA   allopurinoL (ZYLOPRIM) 100 mg tablet Take 200 mg by mouth daily. Yes Provider, Historical   ergocalciferol (ERGOCALCIFEROL) 1,250 mcg (50,000 unit) capsule Take 50,000 Units by mouth every seven (7) days. Indications:     Provider, Historical   acetaminophen (TYLENOL) 500 mg tablet Take  by mouth every six (6) hours as needed for Pain. Provider, Historical       FAMILY HISTORY: History reviewed. No pertinent family history. SOCIAL HISTORY:   Social History     Socioeconomic History    Marital status:    Tobacco Use    Smoking status: Former     Types: Cigarettes     Quit date: 2010     Years since quittin.9    Smokeless tobacco: Never   Substance and Sexual Activity    Alcohol use: Yes     Alcohol/week: 2.0 standard drinks     Types: 1 Cans of beer, 1 Drinks containing 0.5 oz of alcohol per week    Drug use: No     Comment: H/O in the marijuana use in the past    Sexual activity: Yes     Partners: Female       REVIEW OF SYSTEMS: All review of systems are negative. PHYSICAL EXAMINATION: For a detailed physical exam, please refer to the patient's chart. HOSPITAL COURSE: The patient was taken to surgery the day of admission. he underwent left total hip replacement via the anterior approach. Operative course was benign. Estimated blood loss approximately 300 cc. The patient was taken to the PACU in stable condition and was later taken to the floor in stable condition. Post-op Day of surgery, patient has done very well.  he has had little to no pain. he had been cleared by physical therapy with stair training. he was placed on Aspirin for DVT prophylaxis. his vitals have remained stable.   he has also remained hemodynamically stable. The patient has been recommended for discharge home. DISCHARGE INSTRUCTIONS: The patient is to be discharged home. he is to continue on his prior medications per the medication reconciliation form, to which we will add:         1)  Aspirin 81 mg; 1 tablet p.o. b.i.d. X 21 days         2)  Mobic 7.5 mg; 1 tablet p.o. BID x 14 days           3)  Roxicodone 5 mg; 1 tablets p.o. every 4 hours p.r.n. for pain         4)  Duricef 500 mg; 1 tablet p.o. every 12 hours x 5 days    The patient is to continue at home with home physical therapy 3 times a week to work on gait training, range of motion, strengthening, and weightbearing exercises as tolerated on his left lower extremity. The patient is to progress from a walker to a cane to complete total weightbearing as tolerable. The patient is to continue to keep his incision dry. The patient is to followup with Dr. Raguel Sandhoff, Jose Eduardo Bundy PA-C, and/or Eliza Ventura PA-C in the office approximately 10-14 days status post for x-rays and further evaluation.       Sulaiman Dalal 1723, [address]  31/47/62199:[id number] PM

## 2022-12-22 NOTE — ANESTHESIA PREPROCEDURE EVALUATION
Relevant Problems   No relevant active problems       Anesthetic History     PONV   Pertinent negatives: No increased risk of difficult airway, pseudocholinesterase deficiency, malignant hyperthermia and history of awareness of surgery under anesthesia  Comments: IV meds worked well in past, does not want scopolamine patch at this time     Review of Systems / Medical History  Patient summary reviewed, nursing notes reviewed and pertinent labs reviewed    Pulmonary        Sleep apnea: CPAP      Pertinent negatives: No COPD, asthma and recent URI  Comments: Former smoker   Neuro/Psych   Within defined limits           Cardiovascular  Within defined limits                Exercise tolerance: >4 METS     GI/Hepatic/Renal  Within defined limits              Endo/Other        Obesity and arthritis  Pertinent negatives: No diabetes, hypothyroidism, hyperthyroidism and blood dyscrasia   Other Findings              Physical Exam    Airway  Mallampati: II  TM Distance: 4 - 6 cm  Neck ROM: normal range of motion   Mouth opening: Normal     Cardiovascular  Regular rate and rhythm,  S1 and S2 normal,  no murmur, click, rub, or gallop             Dental      Comments: Missing one lower left and upper right molar   Pulmonary  Breath sounds clear to auscultation               Abdominal  GI exam deferred       Other Findings            Anesthetic Plan    ASA: 2  Anesthesia type: general          Induction: Intravenous  Anesthetic plan and risks discussed with: Patient and Spouse      GA/LMA

## 2022-12-22 NOTE — PERIOP NOTES
Patient rates pain \"7\" describes it as a pressure.   Remains very drowsy having to prompt him to take a deep breath from time to time, placed on nasal cannula 2 lpm.

## 2022-12-22 NOTE — PERIOP NOTES
Reviewed PTA medication list with patient/caregiver and patient/caregiver denies any additional medications. Patient admits to having a responsible adult care for them at home for at least 24 hours after surgery. Patient encouraged to use gown warming system and informed that using said warming gown to regulate body temperature prior to a procedure has been shown to help reduce the risks of blood clots and infection. Patient's pharmacy of choice verified and documented in PTA medication section. Dual skin assessment & fall risk band verification completed with Charlotte SHORE.

## 2022-12-22 NOTE — PERIOP NOTES
Relaxed body posture, napping on and off pain is tolerable. Vital signs with in limits encouraged to take deep breath occasionally. Tolerates ice chips.

## 2022-12-22 NOTE — INTERVAL H&P NOTE
Update History & Physical    The Patient's History and Physical of December 16, 2022 was reviewed with the patient and I examined the patient. There was no change. The surgical site was confirmed by the patient and me. Plan:  The risk, benefits, expected outcome, and alternative to the recommended procedure have been discussed with the patient. Patient understands and wants to proceed with the procedure.     Electronically signed by Sumaya Bey MD on 12/22/2022 at 7:23 AM

## 2022-12-23 ENCOUNTER — HOME CARE VISIT (OUTPATIENT)
Dept: SCHEDULING | Facility: HOME HEALTH | Age: 52
End: 2022-12-23
Payer: COMMERCIAL

## 2022-12-23 VITALS — HEART RATE: 78 BPM | RESPIRATION RATE: 17 BRPM

## 2022-12-23 PROCEDURE — G0151 HHCP-SERV OF PT,EA 15 MIN: HCPCS

## 2022-12-23 NOTE — Clinical Note
Bradly Pires  [birth year]  [id number]  Dr Diamond Church THR  Ordered Mepilex  Dressing changes 12/29 and 1/3  [employer] insurance   2 w 1 (please add to Saturday)   3 w 1  2 w 1

## 2022-12-23 NOTE — HOME HEALTH
S: patient reports that he is doing very well - his pain is controlled and he has not been using a device  L TKR 2019 - Dr Mitchell Hobbs THR 8 years ago - Dr Berry Ramirez  O: PAIN: patient is taking pain medication on schedule, educated patient on use of ice for pain and edema  patient to apply ice to the left hip for pain and swelling control per MD protocol using a barrier to protect the skin. Patient to monitor the skin for any adverse effects such as color changes, irritation, mottled appearance. Patient to use ice for 20 minutes an hour for the first 2-3 days and then can reduce to 20 minutes 4-5 times a day. WOUND:L hip covered in the mepilex bandage  - wound not visible. surrouding the bandage there is moderate swelling without redness. per MD orders did not remove the bandage but educated patient on signs of infection and to NOT remove the bandage   bandage changes due 12/29/22 and 1/3/23  ROM: L  hip flexion in supine heel slide 58 degrees  L hip flexion in standing 70 degrees with some compensatory hip hiking   STRENGTH: R knee ext 5/5, R knee flexion 5/5, R hip flex 5/5, R hip abd/adduction 5/5  L knee flexion and extension 4/5, L hip flexion 3-/5 abduction and adduction 3-/5  BED MOBILITY: patient is independent with bed mobility  EQUIPMENT: FWW, SPC  TRANSFERS: patient completed sit to stand from the chair and the toilet with exaggerated SIDNEY, maintenance of the L LE partially extended out in front of him, full weight shift to the right LE to completely unweight the L LE.    GAIT: ambulating in the home for about 75 feet without a device per patient request with SBA (he was educated if he starts limping or has any gait deviations he must use a device to correct - he an his wife expressed understanding)- he had a mildly antalgic pattern but was able to correct with cues  STEPS: not tested  BALANCE: tinetti 23/28 moderate fall risk   RESPONSE TO TREATMENT:patient demonstrated a positive outcome post treatment and reported a reduction in pain, increased comfort and increased confidence with transfers and mobility. Patient reported good understanding of the HEP and reports confidence in ability to complete the program as prescribed by PT  RESPONSE TO EDUCATION: patient was educated on: safety, signs of infection, fall risk, when to use a device   Patient expressed understanding of all education provided and was able to repeat back education, precautions, and HEP. A:ASSESSMENT AND PROGRESS TOWARD GOALS:  Patient demonstrated a positive result to therapy this date as evidenced by an improvement in gait pattern with cues, decreased pain with exercise and walking and patient expressing an understanding of the rehab plan due to therapy verbal and written instructions. Goals established for increased independence in the home, safe mobility in the home, improvement in strength and ROM - all designed to reduce fall risk and progress toward independence. Patient will benefit from continued PT intervention to progress toward meeting all established goals    P:.continue with progression of mobility, ther ex for strength, ROM, provide education to patient and caregivers to maximize the recovery and reduce the fall risk to progress toward a return to independent function    Skilled services/Home bound verification: MD referral for HHPT following recent hospital stay. HHPT is medically necessary to address  dx and clinical findings: decreased ROM, decreased strength, impaired gait, decreased ability w stair negotiation, increased swelling, decreased transfer status, decreased endurance, decreased balance and decreased safety, increased pain in order to improve functional mobility/quality of life, decrease burden of care, reduce risk for re-hospitalization, work towards patient's personal goals of return to PLOF w decrease risk for falls.     Skilled Reason for admission/summary of clinical condition:  s/p L THR by Dr Jimi Nettles    Caregiver: patient lives with his wife and son in a  story house with steps to enter at the front and garage door. HIs wife is the main caregiver and assists as needed wiht meals, medications, ADLs, mobility and transportation    Medications reconciled and all medications are available in the home this visit. The following education was provided regarding medications, medication interactions, and look a like medications:  Meds are effective at this time. no discrepancies noted  Instructed patient/caregiver to take exact amount of oxycodone prescribed, signs and symptoms of oversedation, notify SN/PT if oversedated. May cause constipation, notify SN/PT if no BM x 3 days. Home health supplies ordered/delivered this visit include: mepilex x 2    Patient education provided: safety, fall risk, HEP. Patient/caregiver degree of understanding:good    Home exercise program: supine ankle pumps, quad sets, heel slides, SAQ  seated LAQ, isometric hip adduction   walking every hour during waking hours   ice at least 4 times a day for 20 minutes, more if needed    Pt/Caregiver instructed on plan of care and are agreeable to plan of care      Plan of care called to attending MD: Dr Elaine Cook written copy of the [facility] of Rights was given and verbally reviewed on this visit. . The patient or representative was given the opportunity to ask pertinent questions regarding the bill of rights. Janny Maillard of rights information was received by patient    Discharge planning discussed with patient and caregiver. DC to self and family under MD supervision when all goals are met or maximum potential is reached. Pt/Caregiver did verbalize understanding of DC planning. Next MD appointment 1/6/23 with Dr Chon Jeong. Patient/caregiver instructed to keep appointment as lack of follow through with MD appointment could result in discontinuation of home care services for non-compliance.

## 2022-12-24 ENCOUNTER — HOME CARE VISIT (OUTPATIENT)
Dept: SCHEDULING | Facility: HOME HEALTH | Age: 52
End: 2022-12-24
Payer: COMMERCIAL

## 2022-12-24 VITALS
HEART RATE: 81 BPM | SYSTOLIC BLOOD PRESSURE: 150 MMHG | DIASTOLIC BLOOD PRESSURE: 90 MMHG | OXYGEN SATURATION: 98 % | RESPIRATION RATE: 18 BRPM | TEMPERATURE: 98 F

## 2022-12-24 PROCEDURE — G0157 HHC PT ASSISTANT EA 15: HCPCS

## 2022-12-24 NOTE — HOME HEALTH
SUBJECTIVE: Patient reported feeling L hip pain #4-5/10 this morning. CAREGIVER INVOLVEMENT/ASSISTANCE NEEDED FOR: Patient's wife, Wang Lopez, assists with transportation and IADLS. HOME HEALTH SUPPLIES BY TYPE AND QUANTITY ORDERED/DELIVERED THIS VISIT INCLUDE: None needed at this time. OBJECTIVE:  See interventions. PATIENT RESPONSE TO TREATMENT: Patient did not report any increase in pain after walking and exercising. PATIENT LEVEL OF UNDERSTANDING OF EDUCATION PROVIDED: Patient verbalized understanding on using ice, opioid meds, pain management techniques, fall prevention, and sign/symptoms of infection. ASSESSMENT OF PROGRESS TOWARD GOALS: Patient is progressing toward goals for gait and transfers. Pt previously needed SBA to ambulate safely but progressed to close S for gait training today. Patient ambulated with decreased juan, slight L antalgic gait, decreased foot clearance, and forward flexed trunk. Gave multiple vc's for pt to lift B foot higher to clear floor safely and to maintain erect posture. Pt needs reinforcement for safety with gait and to improve posture. Instructed pt to use AD if his pain level is elevated to avoid antalgic gait. Pt also needed close S for transfer training today. Gave vc's for pt to lean forward and to push off using B UE to stand up. Pt needs reinforcement to improve transfer technique. Pt extends L LE to avoid weight bearing on L hip. In addition, reviewed supine and seated therapeutic exercises with patient and instructed him to comply with follow-up. CONTINUED NEED FOR THE FOLLOWING SKILLS: Gait Training, Stairs Training, Transfer Training, [employer], Balance Training, and Therapeutic Exercises. PLAN FOR NEXT VISIT: Patient will be seen 3w1 2w1 to increase safety with gait, to improve transfer technique, and to increase strength of B LE.   DISCHARGE PLANNING DISCUSSED WITH PATIENT/CAREGIVER: Discharge patient to family with MD supervision when all goals are met. Pt/Caregiver did verbalize understanding of discharge planning.

## 2022-12-26 ENCOUNTER — HOME CARE VISIT (OUTPATIENT)
Dept: SCHEDULING | Facility: HOME HEALTH | Age: 52
End: 2022-12-26
Payer: COMMERCIAL

## 2022-12-26 ENCOUNTER — HOME CARE VISIT (OUTPATIENT)
Dept: HOME HEALTH SERVICES | Facility: HOME HEALTH | Age: 52
End: 2022-12-26
Payer: COMMERCIAL

## 2022-12-26 VITALS
SYSTOLIC BLOOD PRESSURE: 159 MMHG | HEART RATE: 96 BPM | TEMPERATURE: 97.9 F | DIASTOLIC BLOOD PRESSURE: 90 MMHG | RESPIRATION RATE: 16 BRPM | OXYGEN SATURATION: 97 %

## 2022-12-26 PROCEDURE — G0157 HHC PT ASSISTANT EA 15: HCPCS

## 2022-12-26 NOTE — HOME HEALTH
SUBJECTIVE: Patient reported feeling L hip pain #4-5/10 again this morning. CAREGIVER INVOLVEMENT/ASSISTANCE NEEDED FOR: Patient's wife, Alecia Ervin, assists with transportation and IADLS. HOME HEALTH SUPPLIES BY TYPE AND QUANTITY ORDERED/DELIVERED THIS VISIT INCLUDE: None needed at this time. OBJECTIVE:  See interventions. PATIENT RESPONSE TO TREATMENT: Patient had no c/o increased pain after walking and negotiating stairs. PATIENT LEVEL OF UNDERSTANDING OF EDUCATION PROVIDED: Patient verbalized understanding on using ice, opioid meds, pain management techniques, fall prevention, and sign/symptoms of infection. ASSESSMENT OF PROGRESS TOWARD GOALS: Patient is progressing toward goals for gait and stairs. Pt previously needed close S to ambulate safely but progressed to Supervision for gait training today. Patient ambulated with decreased juan, L antalgic gait, decreased foot clearance, and decreased step length. Gave multiple vc's for pt to lift B foot higher to clear floor safely and to take longer steps. Pt demonstrated increased safety with gait and improved posture after giving verbal cues. Pt also previously did not go up and down stairs but progressed to Supervision for stairs training today. Pt led with his R LE to go up stairs and led with his L LE to go down stairs. Gave vc's to place entire foot on each step for safety. In addition, L hip flexion improved from 58 degrees in supine to 65 degrees in supine and 68 degrees in standing. Discussed with patient/CG plan to discharge pt from [facility] PT services next week. Pt/CG verbalized understanding and is in agreement with discharge plan. CONTINUED NEED FOR THE FOLLOWING SKILLS: Gait Training, Stairs Training, Transfer Training, [employer], Balance Training, and Therapeutic Exercises. PLAN FOR NEXT VISIT: Patient will be seen 2w2 to increase safety with gait, to improve transfer technique, and to increase strength of B LE.   DISCHARGE PLANNING DISCUSSED WITH PATIENT/CAREGIVER: Discharge patient to family with MD supervision when all goals are met. Pt/Caregiver did verbalize understanding of discharge planning.

## 2022-12-28 ENCOUNTER — HOME CARE VISIT (OUTPATIENT)
Dept: SCHEDULING | Facility: HOME HEALTH | Age: 52
End: 2022-12-28
Payer: COMMERCIAL

## 2022-12-28 PROCEDURE — G0157 HHC PT ASSISTANT EA 15: HCPCS

## 2022-12-30 ENCOUNTER — HOME CARE VISIT (OUTPATIENT)
Dept: SCHEDULING | Facility: HOME HEALTH | Age: 52
End: 2022-12-30
Payer: COMMERCIAL

## 2022-12-30 PROCEDURE — G0157 HHC PT ASSISTANT EA 15: HCPCS

## 2023-01-03 ENCOUNTER — HOME CARE VISIT (OUTPATIENT)
Dept: SCHEDULING | Facility: HOME HEALTH | Age: 53
End: 2023-01-03
Payer: COMMERCIAL

## 2023-01-03 VITALS
HEART RATE: 86 BPM | DIASTOLIC BLOOD PRESSURE: 70 MMHG | SYSTOLIC BLOOD PRESSURE: 130 MMHG | DIASTOLIC BLOOD PRESSURE: 78 MMHG | RESPIRATION RATE: 16 BRPM | OXYGEN SATURATION: 94 % | TEMPERATURE: 96.6 F | TEMPERATURE: 97.3 F | HEART RATE: 79 BPM | OXYGEN SATURATION: 97 % | OXYGEN SATURATION: 93 % | DIASTOLIC BLOOD PRESSURE: 60 MMHG | RESPIRATION RATE: 18 BRPM | TEMPERATURE: 98 F | RESPIRATION RATE: 16 BRPM | HEART RATE: 86 BPM | SYSTOLIC BLOOD PRESSURE: 130 MMHG | SYSTOLIC BLOOD PRESSURE: 110 MMHG

## 2023-01-03 PROCEDURE — G0157 HHC PT ASSISTANT EA 15: HCPCS

## 2023-01-04 ENCOUNTER — HOME CARE VISIT (OUTPATIENT)
Dept: SCHEDULING | Facility: HOME HEALTH | Age: 53
End: 2023-01-04
Payer: COMMERCIAL

## 2023-01-04 VITALS
OXYGEN SATURATION: 96 % | DIASTOLIC BLOOD PRESSURE: 85 MMHG | TEMPERATURE: 99 F | RESPIRATION RATE: 16 BRPM | SYSTOLIC BLOOD PRESSURE: 142 MMHG | HEART RATE: 98 BPM

## 2023-01-04 PROCEDURE — G0151 HHCP-SERV OF PT,EA 15 MIN: HCPCS

## 2023-01-04 NOTE — Clinical Note
Patient has been seen for home care PT following a L THR by Dr Hetal Curry. He has made great progress in all areas. He in independent with all ADLs at this time and all transfers in and out of the home. He is walking with a single point cane with modified independence on even and uneven surfaces with symmetrical pattern and good safety awareness. He is able to egress and enter his house independently via 4 steps at the front door and is able to go up and down a flight of steps to access his bedroom and full bathroom on the second floor. Current range of motion is L hip flexion 80 degrees in standing. He is independent with his HEP and consistent with follow through. He will discuss transition to outpatient therapy at his follow up appointment and is being discharged from home care PT today.

## 2023-01-04 NOTE — CASE COMMUNICATION
Assessment and Summary of Care:  Patient's current functional status before discharge is as follows  Strength: N/T  ROM:  N/T  Bed Mobility: Independent  Transfers: Independent  Gait/WC mobility: 400' with cane on even and uneven surfaces with occaisona cue for L heel strike  Stairs: 3 steps step over step to descend and step to pattern to ascend with rial and cane for support form garage to enter/exit home  Special Tests: Tinetti test  28/28  Recommendations: Patient returns to Dr Lianet Coffey 01/06-he plans to discuss outpatient therapy and return to work date at that time.   He is in agreement with discharge

## 2023-01-04 NOTE — HOME HEALTH
SUBJECTIVE: Patient reports he feels he \"over did it. \" over the weekend and now has increased soreness. CAREGIVER INVOLVEMENT/ASSISTANCE NEEDED FOR: Wife and family able to offer assist as needed-patient manages his own meds    [address] ORDERED/DELIVERED THIS VISIT INCLUDE: none    OBJECTIVE:  See interventions. PATIENT RESPONSE TO TREATMENT:  Good-no increase in pain reported following treatment-good tolerance of bandage change    PATIENT LEVEL OF UNDERSTANDING OF EDUCATION PROVIDED: Good-educational goals met    ASSESSMENT OF PROGRESS TOWARD GOALS: Utilizing cane due to increased soreness overall -diffuse edma around L hipi ncision-no sxs of infection. Tinetti test score improved from 23/28 to 28/28 placing patient in a low risk category for falls. Sit to stand no longer needs B UE to push up. Stair mobility: able to enter/egress home with 3 steps into garage with rail and cane for support-step over step to descend and step to pattern to ascend Mod I    Assessment and Summary of Care:  Patient's current functional status before discharge is as follows  Strength: N/T  ROM:  N/T  Bed Mobility: Independent  Transfers: Independent  Gait/WC mobility: 400' with cane on even and uneven surfaces with occaisona cue for L heel strike  Stairs: 3 steps step over step to descend and step to pattern to ascend with rial and cane for support form garage to enter/exit home  Special Tests: Tinetti test 28/28  Recommendations: Patient returns to Dr Saavedra Mediate 01/06-he plans to discuss outpatient therapy and return to work date at that time.   He is in agreement with discharge      CONTINUED NEED FOR THE FOLLOWING SKILLS: Patient will be seen 3 x week for Physical Therapy to continue to work toward goals within POC and HHPT is medically necessary to address  dx and clinical findings: decreased ROM, decreased strength, impaired gait, decreased ability w stair negotiation, increased swelling, decreased bed mobility, decreased transfer status, decreased endurance, decreased balance and decreased safety, increased pain in order to improve functional mobility/quality of life, decrease burden of care, reduce risk for re-hospitalization, work towards patient's personal goals of return to PLOF w decrease risk for falls. PLAN FOR NEXT VISIT: Discharge assessment from Supervising Physical therapist.    THE FOLLOWING DISCHARGE PLANNING WAS DISCUSSED WITH THE PATIENT/CAREGIVER: This is visit number  6 out of  7  planned visits. Patient i in agreement with discharge and plans to discuss outpatient therapy at return visit to Dr Louie Jacques.     NEXT MD APPT: 01/06 Return to Dr Louie Jacques

## 2023-01-04 NOTE — HOME HEALTH
SUBJECTIVE: Patient reports he is spacing out pain meds to every 6 hours  CAREGIVER INVOLVEMENT/ASSISTANCE NEEDED FOR: Wife and family able to offer assist as needed-patient manages his own meds  [address] ORDERED/DELIVERED THIS VISIT INCLUDE: none  OBJECTIVE:  See interventions. PATIENT RESPONSE TO TREATMENT:  Good-no increase in pain reported following treatment  PATIENT LEVEL OF UNDERSTANDING OF EDUCATION PROVIDED: Good-instructed with pain mgmt/cryotherapy, elevation, fall risk prevention and opioid scheduling  ASSESSMENT OF PROGRESS TOWARD GOALS: Instruction with patient to utilize cane as primary AD to decrease gait deviations and fall risk-patient agreed and  patterning improved-needs occasional cues for L heel strike for L foot clearance-needs reinforcement. CONTINUED NEED FOR THE FOLLOWING SKILLS: Patient will be seen 3 x week for Physical Therapy to continue to work toward goals within POC and HHPT is medically necessary to address  dx and clinical findings: decreased ROM, decreased strength, impaired gait, decreased ability w stair negotiation, increased swelling, decreased bed mobility, decreased transfer status, decreased endurance, decreased balance and decreased safety, increased pain in order to improve functional mobility/quality of life, decrease burden of care, reduce risk for re-hospitalization, work towards patient's personal goals of return to PLOF w decrease risk for falls. PLAN FOR NEXT VISIT: Gait/transfer training, strengthening exercises  THE FOLLOWING DISCHARGE PLANNING WAS DISCUSSED WITH THE PATIENT/CAREGIVER: This is visit number  4 out of  7  planned visits.   NEXT MD APPT: 01/06 Return to Dr Kim Horton

## 2023-01-04 NOTE — HOME HEALTH
SUBJECTIVE: Patient reports compliance with HEP  CAREGIVER INVOLVEMENT/ASSISTANCE NEEDED FOR: Wife and family able to offer any assist needed-Patient manages his own meds  [address] ORDERED/DELIVERED THIS VISIT INCLUDE: received bandages  OBJECTIVE:  See interventions. PATIENT RESPONSE TO TREATMENT:  Good response to bandage change  PATIENT LEVEL OF UNDERSTANDING OF EDUCATION PROVIDED: Instructed with fall risk prevention and pain mgmt. Instructed patient to return to FWW/cane if pain/edema increase, if ill, dizziness or with fatigue, and inclement weather-while on pain meds to utilize FWW for night trips to the bathroom-patient agreed. ASSESSMENT OF PROGRESS TOWARD GOALS: Bandage changed-no sxs of infection . Reviewed HEP-no cues needed. Gait without an AD within home for short distances with good quality-will conitnue to use cane for community amb and uneven surfaces. Progressing toward goals. CONTINUED NEED FOR THE FOLLOWING SKILLS: Patient will be seen  x week for Physical Therapy to continue to work toward goals within POC and HHPT is medically necessary to address  dx and clinical findings: decreased ROM, decreased strength, impaired gait, decreased ability w stair negotiation, increased swelling, decreased bed mobility, decreased transfer status, decreased endurance, decreased balance and decreased safety, increased pain in order to improve functional mobility/quality of life, decrease burden of care, reduce risk for re-hospitalization, work towards patient's personal goals of return to PLOF w decrease risk for falls. PLAN FOR NEXT VISIT: Gait/ transfer training, strengthening exercises  THE FOLLOWING DISCHARGE PLANNING WAS DISCUSSED WITH THE PATIENT/CAREGIVER: This is visit number 5  out of 7  planned visits.   NEXT MD APPT:01/06 return to Dr Marco Cano

## 2023-01-04 NOTE — HOME HEALTH
S: patient reports that he is doing very well - his pain is controlled - discussed needing to transition off of the oxycodone and try tylenol as prescribed (1000 mg q 6 hours as needed)  L TKR 2019 - Dr Hudson Chavarria THR 8 years ago - Dr Keri Leon    O: PAIN: patient is taking pain medication on schedule, educated patient on use of ice for pain and edema  patient to apply ice to the left hip for pain and swelling control per MD protocol using a barrier to protect the skin. Patient to monitor the skin for any adverse effects such as color changes, irritation, mottled appearance. Patient to use ice for 20 minutes 4-5 times a day. WOUND:L hip covered in the mepilex bandage  - wound not visible. Per documentation and discussion with the LPTA who performed the previous dressing change, the wound is well approximated and reepthelializing with no signs of infection     ROM: L  hip flexion in supine heel slide 58 degrees AT EVALUATION  L hip flexion in standing 70 degrees with some compensatory hip hiking AT EVALUATION  L hip flexion in standing 80 degrees AT DISCHARGE    STRENGTH: R knee ext 5/5, R knee flexion 5/5, R hip flex 5/5, R hip abd/adduction 5/5  L knee flexion and extension 4/5, L hip flexion 3-/5 abduction and adduction 3-/5    BED MOBILITY: patient is independent with bed mobility    EQUIPMENT: SPC    TRANSFERS: patient is independent with all transfers in and out of the home     GAIT/STEPS:He is walking with a single point cane with modified independence on even and uneven surfaces with symmetrical pattern and good safety awareness. He is able to egress and enter his house independently via 4 steps at the front door and is able to go up and down a flight of steps to access his bedroom and full bathroom on the second floor.     BALANCE: tinetti 23/28 moderate fall risk AT EVALUATION  tinetti 25/28 AT DISCHARGE    RESPONSE TO TREATMENT:patient demonstrated a positive outcome post treatment and reported a reduction in pain, increased comfort and increased confidence with transfers and mobility. Patient reported good understanding of the HEP and reports confidence in ability to complete the program as prescribed by PT    RESPONSE TO EDUCATION: patient was educated on: safety, signs of infection, fall risk, and progression of exercises   Patient expressed understanding of all education provided and was able to repeat back education, precautions, and HEP. A:ASSESSMENT AND PROGRESS TOWARD GOALS:  Patient has been seen for home care PT following a L THR by Dr Kim Horton. He has made great progress in all areas. He in independent with all ADLs at this time and all transfers in and out of the home. He is walking with a single point cane with modified independence on even and uneven surfaces with symmetrical pattern and good safety awareness. He is able to egress and enter his house independently via 4 steps at the front door and is able to go up and down a flight of steps to access his bedroom and full bathroom on the second floor. Current range of motion is L hip flexion 80 degrees in standing. He is independent with his HEP and consistent with follow through. He will discuss transition to outpatient therapy at his follow up appointment and is being discharged from home care PT today. P: DC PT     Skilled services/Home bound verification: MD referral for HHPT following recent hospital stay. HHPT is medically necessary to address  dx and clinical findings: decreased ROM, decreased strength, impaired gait, decreased ability w stair negotiation, increased swelling, decreased transfer status, decreased endurance, decreased balance and decreased safety, increased pain in order to improve functional mobility/quality of life, decrease burden of care, reduce risk for re-hospitalization, work towards patient's personal goals of return to PLOF w decrease risk for falls.     Skilled Reason for admission/summary of clinical condition:  s/p L THR by Dr Jenniffer Marquez: patient lives with his wife and son in a  story house with steps to enter at the front and garage door. HIs wife is the main caregiver and assists as needed with transportation    Medications reconciled and all medications are available in the home this visit. The following education was provided regarding medications, medication interactions, and look a like medications:  Meds are effective at this time. no discrepancies noted  Instructed patient/caregiver to take exact amount of oxycodone prescribed, signs and symptoms of oversedation, notify SN/PT if oversedated. May cause constipation, notify SN/PT if no BM x 3 days. Home health supplies ordered/delivered this visit include: mepilex x 2    Patient education provided: safety, fall risk, HEP.   Patient/caregiver degree of understanding:good    Home exercise program:supine ankle pumps, quad sets, heel slides, SAQ   seated LAQ, isometric hip adduction   standing exercises with support of the counter: B heel/toe raises, L hip flexion, L hip abd/side stepping along the counter, L hamsting curls all 1 x 15 and sit tostand 1 x 5   walking every hour during waking hours   ice at least 4 times a day for 20 minutes, more if needed  continue to progress walking with increasing time and distance as tolerated

## 2023-01-09 ENCOUNTER — HOSPITAL ENCOUNTER (OUTPATIENT)
Dept: PHYSICAL THERAPY | Age: 53
Discharge: HOME | End: 2023-01-09
Payer: COMMERCIAL

## 2023-01-09 PROCEDURE — 97162 PT EVAL MOD COMPLEX 30 MIN: CPT

## 2023-01-09 PROCEDURE — 97110 THERAPEUTIC EXERCISES: CPT

## 2023-01-09 NOTE — PROGRESS NOTES
PHYSICAL THERAPY - DAILY TREATMENT NOTE    Patient Name: Cecille Romano. Date: 2023  : 1970   YES Patient  Verified  Visit #:     Insurance: Payor: BLUE CROSS / Plan: [facility] / Product Type: PPO /      In time: 11:05 A Out time: 12:05 P   Total Treatment Time: 55     BCBS/Medicare Time Tracking (below)   Total Timed Codes (min):  45 1:1 Treatment Time:  45     TREATMENT AREA = Left hip pain [M25.552]    SUBJECTIVE  Pain Level (on 0 to 10 scale):  3  / 10   Medication Changes/New allergies or changes in medical history, any new surgeries or procedures?     NO    If yes, update Summary List   Subjective Functional Status/Changes:  []  No changes reported     See POC          Modalities Rationale:     decrease edema, decrease inflammation, and decrease pain to improve patient's ability to return to pain-free ADLs    min [] Estim, type/location:                                      []  att     []  unatt     []  w/US     []  w/ice    []  w/heat    min []  Mechanical Traction: type/lbs                   []  pro   []  sup   []  int   []  cont    []  before manual    []  after manual    min []  Ultrasound, settings/location:      min []  Iontophoresis w/ dexamethasone, location:                                               []  take home patch       []  in clinic   10 min [x]  Ice     []  Heat    location/position: Supine to L hip    min []  Vasopneumatic Device, press/temp:    If using vaso (only need to measure limb vaso being performed on)      pre-treatment girth :       post-treatment girth :       measured at (landmark location) :      min []  Other:    [] Skin assessment post-treatment (if applicable):    [x]  intact    [x]  redness- no adverse reaction                  []redness - adverse reaction:        10 min Therapeutic Exercise:  [x]  See flow sheet   Rationale:      increase ROM and increase strength to improve the patients ability to return to normalized gait with = step length     Billed With/As:   [] TE   [] TA   [] Neuro   [] Self Care Patient Education: [x] Review HEP    [] Progressed/Changed HEP based on:   [] positioning   [] body mechanics   [] transfers   [] heat/ice application    [] other:      Other Objective/Functional Measures:    See POC     Post Treatment Pain Level (on 0 to 10) scale:   3  / 10     ASSESSMENT  Assessment/Changes in Function:     See POC     []  See Progress Note/Recertification   Patient will continue to benefit from skilled PT services to modify and progress therapeutic interventions, address functional mobility deficits, address ROM deficits, address strength deficits, analyze and address soft tissue restrictions, analyze and cue movement patterns, analyze and modify body mechanics/ergonomics, assess and modify postural abnormalities, address imbalance/dizziness, and instruct in home and community integration to attain remaining goals.    Progress toward goals / Updated goals:    Progressing towards goals established at [id number] [facility] [id number]  []  Upgrade activities as tolerated YES Continue plan of care   []  Discharge due to :    []  Other:      Therapist: Ruslan Joel PT    Date: 1/9/2023 Time: 9:39 AM     Future Appointments   Date Time Provider Jennifer Dickerson   1/9/2023 11:00 AM Lakhwinder Marino [place], PT [facility]

## 2023-01-09 NOTE — PROGRESS NOTES
[address] [place] [address] PHYSICAL THERAPY AT [address] [place] [address]  [address], [address] ,[address], [address]  Phone: [phone]  Fax: [phone] / [address] [address] PHYSICAL THERAPY SERVICES  Patient Name: Elie Jamison. : 1970   Medical   Diagnosis: Left hip pain [M25.552] Treatment Diagnosis: S/p L KAREN   Onset Date: 22     [facility]   Prior Hospitalization: See medical history Provider #: [id number]   Prior Level of Function: Limited with prolonged ambulation/standing & stair negotiation due to pain, ambulating without assistance   Comorbidities: S/p L TKR 2019, R KAREN   Medications: Verified on Patient Summary List   The Plan of Care and following information is based on the information from the initial evaluation.   ==================================================================================  Assessment / key information:  Patient is a pleasant 47 y.o. male who presents to [facility] s/p L KAREN 22. Patient was DC from [employer] on 23. He entered clinic ambulating without assistive device, he reports misplacing his SPC. Current c/o pain are intermittent in nature & worsen with activities such as attempting to put his underwear or shoes on in a seated position. Sx improve with use of ice at home. Average reported pain level at 2-4/10, 7-8/10 at worst & 2/10 at best.  His RTW date is tentative. Upon objective evaluation, patient demonstrates AROM as follows L hip flexion 85 degrees as compared to 110 degrees on R, moderate tightness of hip flexors on L. MMT revealed overall L hip strength at 5-5 no c/o pain upon MMT. Decreased step length on L LE, noted upon observation of ambulation in clinic without AD. Some redness along anterior incision, discussed scar care with patient & to monitor wound continually.   Patient can benefit PT interventions to improve gait mechanics, decrease pain & improve hip mobility to facilitate return to unlimited ADLs, work activities & overall functional status.   ==================================================================================  Eval Complexity: History MEDIUM  Complexity : 1-2 comorbidities / personal factors will impact the outcome/ POC ;  Examination  MEDIUM Complexity : 3 Standardized tests and measures addressing body structure, function, activity limitation and / or participation in recreation ; Presentation MEDIUM Complexity : Evolving with changing characteristics ; Decision Making MEDIUM Complexity : FOTO score of 26-74; Overall Complexity MEDIUM  Problem List: pain affecting function, decrease ROM, decrease strength, impaired gait/ balance, decrease ADL/ functional abilitiies, decrease activity tolerance, decrease flexibility/ joint mobility, and decrease transfer abilities   Treatment Plan may include any combination of the following: Therapeutic exercise, Neuromuscular reeducation, Manual therapy, Therapeutic activity, Self care/home management, Electric stim unattended , Vasopneumatic device, Aquatic therapy, Gait training, Ultrasound, Electric stim attended, Needle insertion w/o injection (1 or 2 muscles), and Needle insertion w/o injection (3+ muscles)  Patient / Family readiness to learn indicated by: asking questions, trying to perform skills and interest  Persons(s) to be included in education: patient (P)  Barriers to Learning/Limitations: None  Measures taken:    Patient Goal (s): \"Range of motion, reduced pain, reduced swelling, future exercises for hips\"   Patient self reported health status: good  Rehabilitation Potential: excellent  Short Term Goals: To be accomplished in  2  weeks:  1) Establish HEP to prevent further disability.     2) Patient will report decreased c/o pain to < or = 1-2/10 to facilitate sleeping at night uninterrupted with manageable sx in L hip.  3) Improve FOTO score from 49 points to > or = 55 points indicating improved tolerance with ADLs in regards to L hip.  4) Improve L hip flexion to > or = 100 degrees in supine so Patient will be able to put on his socks & shoes with manageable in L hip. Long Term Goals: To be accomplished in  4  weeks:  1) Improve FOTO score from 55 points to > or = 65 points indicating improved tolerance with ADLs in regards to L hip. 2) Patient to report 75% improvement in overall function in preparation for return to recreational activities with manageable sx in L hip. 3) Patient will be able to negotiate multiple steps without or with 1 HR & reciprocal pattern with no safety concerns noted. 4) Improve overall strength of L hip to 5/5 with no c/o pain upon MMT so strength is available for return to work at full duty. Frequency / Duration:   Patient to be seen  2-3  times per week for 4  weeks:  Patient / Caregiver education and instruction: self care, activity modification, brace/ splint application and exercises    Therapist Signature: DONAVON Chung, cert MDT Date: 0/5/2797   Certification Period: None Time: 9:38 AM   =================================================================================  I certify that the above Physical Therapy Services are being furnished while the patient is under my care. I agree with the treatment plan and certify that this therapy is necessary.     Physician Signature:                                                             Date:                                     Time:                                                                       Mansoor Kelly, [address]

## 2023-01-12 ENCOUNTER — HOSPITAL ENCOUNTER (OUTPATIENT)
Dept: PHYSICAL THERAPY | Age: 53
Discharge: HOME | End: 2023-01-12
Payer: COMMERCIAL

## 2023-01-12 PROCEDURE — 97110 THERAPEUTIC EXERCISES: CPT

## 2023-01-12 PROCEDURE — 97530 THERAPEUTIC ACTIVITIES: CPT

## 2023-01-12 PROCEDURE — 97112 NEUROMUSCULAR REEDUCATION: CPT

## 2023-01-12 NOTE — PROGRESS NOTES
PHYSICAL THERAPY - DAILY TREATMENT NOTE    Patient Name: Dorota Carter. Date: 2023  : 1970   YES Patient  Verified  Visit #:     Insurance: Payor: BLUE CROSS / Plan: [employer] [facility] / Product Type: PPO /      In time: 2:00 P Out time: 3:00 P   Total Treatment Time: 55     BCBS/Medicare Time Tracking (below)   Total Timed Codes (min):  45 1:1 Treatment Time:  45     TREATMENT AREA =  Left hip pain [M25.552]    SUBJECTIVE  Pain Level (on 0 to 10 scale):  2  / 10   Medication Changes/New allergies or changes in medical history, any new surgeries or procedures? NO    If yes, update Summary List   Subjective Functional Status/Changes:  []  No changes reported     Patient reports no new complaints since eval, his swelling is much better. He went back to the gym but is only doing upper body, sitting on the bench is much easier.             Modalities Rationale:     decrease edema, decrease inflammation, and decrease pain to improve patient's ability to return to pain-free ADls    min [] Estim, type/location:                                      []  att     []  unatt     []  w/US     []  w/ice    []  w/heat    min []  Mechanical Traction: type/lbs                   []  pro   []  sup   []  int   []  cont    []  before manual    []  after manual    min []  Ultrasound, settings/location:      min []  Iontophoresis w/ dexamethasone, location:                                               []  take home patch       []  in clinic   10 min [x]  Ice     []  Heat    location/position: Supine to L hip    min []  Vasopneumatic Device, press/temp:    If using vaso (only need to measure limb vaso being performed on)      pre-treatment girth :       post-treatment girth :       measured at (landmark location) :      min []  Other:    [] Skin assessment post-treatment (if applicable):    [x]  intact    [x]  redness- no adverse reaction                  []redness - adverse reaction:        20 min Therapeutic Exercise:  [x]  See flow sheet   Rationale:      increase ROM and increase strength to improve the patients ability to return to pain-free ADLs      15 min Therapeutic Activity: [x]  See flow sheet   Rationale:    increase ROM, increase strength, and increase proprioception to improve the patients ability to return to stair negotiation     10 min Neuromuscular Re-ed: [x]  See flow sheet   Rationale:    increase ROM, increase strength, improve balance, and increase proprioception to improve the patients ability to return to single leg activities without loss of balance      Billed With/As:   [] TE   [] TA   [] Neuro   [] Self Care Patient Education: [x] Review HEP    [] Progressed/Changed HEP based on:   [] positioning   [] body mechanics   [] transfers   [] heat/ice application    [] other:      Other Objective/Functional Measures:    Initiated exercise per flow sheet      Post Treatment Pain Level (on 0 to 10) scale:   0  / 10     ASSESSMENT  Assessment/Changes in Function:     Good tolerance to initial program, no increase in pain     []  See Progress Note/Recertification   Patient will continue to benefit from skilled PT services to modify and progress therapeutic interventions, address functional mobility deficits, address ROM deficits, address strength deficits, analyze and address soft tissue restrictions, analyze and cue movement patterns, analyze and modify body mechanics/ergonomics, assess and modify postural abnormalities, address imbalance/dizziness, and instruct in home and community integration to attain remaining goals.    Progress toward goals / Updated goals:    Progressing towards goals established at [place] [place] [address]  []  Upgrade activities as tolerated YES Continue plan of care   []  Discharge due to :    []  Other:      Therapist: Torie Thomas PT    Date: 1/12/2023 Time: 2:59 PM     Future Appointments   Date Time Provider Jennifer Dickerson   1/17/2023 11:00 AM Jamila Dante Boggs, PT [facility]   1/19/2023 10:30 AM Radha Tristan, PT [facility]   1/23/2023  5:30 PM Chantel Wallace [facility]   1/25/2023  5:30 PM Traci Wong, PT [facility]   1/30/2023  5:30 PM Traci Wong, PT [facility]   2/1/2023  5:30 PM Stacey Purdy, PT [facility]

## 2023-01-17 ENCOUNTER — HOSPITAL ENCOUNTER (OUTPATIENT)
Dept: PHYSICAL THERAPY | Age: 53
Discharge: HOME | End: 2023-01-17
Payer: COMMERCIAL

## 2023-01-17 PROCEDURE — 97112 NEUROMUSCULAR REEDUCATION: CPT

## 2023-01-17 PROCEDURE — 97116 GAIT TRAINING THERAPY: CPT

## 2023-01-17 PROCEDURE — 97110 THERAPEUTIC EXERCISES: CPT

## 2023-01-17 NOTE — PROGRESS NOTES
PHYSICAL THERAPY - DAILY TREATMENT NOTE    Patient Name: Edilberto George. Date: 2023  : 1970   YES Patient  Verified  Visit #:   3   of   12  Insurance: Payor: BLUE CROSS / Plan: [employer] [facility] / Product Type: PPO /      In time: 2:30 P Out time: 3:30 P   Total Treatment Time: 55     BCBS/Medicare Time Tracking (below)   Total Timed Codes (min):  45 1:1 Treatment Time:  45     TREATMENT AREA =  Left hip pain [M25.552]    SUBJECTIVE  Pain Level (on 0 to 10 scale):  2  / 10   Medication Changes/New allergies or changes in medical history, any new surgeries or procedures? NO    If yes, update Summary List   Subjective Functional Status/Changes:  []  No changes reported     Patient reports he has tightness in his leg, last night was the first time he did not take his pain med for the first time. He was able to put his sock on for the first time in 6 months.           Modalities Rationale:     decrease edema, decrease inflammation, and decrease pain to improve patient's ability to return to pain-free ADLs   min [] Estim, type/location:                                      []  att     []  unatt     []  w/US     []  w/ice    []  w/heat    min []  Mechanical Traction: type/lbs                   []  pro   []  sup   []  int   []  cont    []  before manual    []  after manual    min []  Ultrasound, settings/location:      min []  Iontophoresis w/ dexamethasone, location:                                               []  take home patch       []  in clinic   10 min [x]  Ice     []  Heat    location/position: Supine to L hip     min []  Vasopneumatic Device, press/temp:    If using vaso (only need to measure limb vaso being performed on)      pre-treatment girth :       post-treatment girth :       measured at (landmark location) :      min []  Other:    [] Skin assessment post-treatment (if applicable):    [x]  intact    [x]  redness- no adverse reaction                  []redness - adverse reaction:        20 min Therapeutic Exercise:  [x]  See flow sheet   Rationale:      increase ROM and increase strength to improve the patients ability 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 Billed With/As:   [] TE   [] TA   [] Neuro   [] Self Care Patient Education: [x] Review HEP    [] Progressed/Changed HEP based on:   [] positioning   [] body mechanics   [] transfers   [] heat/ice application    [] other:      Other Objective/Functional Measures: Added H/L hip ABD with orange band & supine bridge     Post Treatment Pain Level (on 0 to 10) scale:   0  / 10     ASSESSMENT  Assessment/Changes in Function:     Good tolerance to current program as well as progression to 6 inch step up     []  See Progress Note/Recertification   Patient will continue to benefit from skilled PT services to modify and progress therapeutic interventions, address functional mobility deficits, address ROM deficits, address strength deficits, analyze and address soft tissue restrictions, analyze and cue movement patterns, analyze and modify body mechanics/ergonomics, assess and modify postural abnormalities, address imbalance/dizziness, and instruct in home and community integration to attain remaining goals.    Progress toward goals / Updated goals:    Progressing towards STG 2 & 4     PLAN  []  Upgrade activities as tolerated YES Continue plan of care   []  Discharge due to :    []  Other: Therapist: Earlene Busch, PT    Date: 1/17/2023 Time: 3:07 PM     Future Appointments   Date Time Provider Jennifer Jayla   1/19/2023 10:30 AM Aneudy Hill, PT [facility]   1/23/2023  5:30 PM Gigi Duarte, PT [facility]   1/25/2023  5:30 PM Gigi Duarte, PT [facility]   1/30/2023  5:30 PM Gigi Duarte, PT [facility]   2/1/2023  5:30 PM Laura Purdy, PT [facility]

## 2023-01-19 ENCOUNTER — HOSPITAL ENCOUNTER (OUTPATIENT)
Dept: PHYSICAL THERAPY | Age: 53
Discharge: HOME | End: 2023-01-19
Payer: COMMERCIAL

## 2023-01-19 PROCEDURE — 97112 NEUROMUSCULAR REEDUCATION: CPT

## 2023-01-19 PROCEDURE — 97140 MANUAL THERAPY 1/> REGIONS: CPT

## 2023-01-19 PROCEDURE — 97110 THERAPEUTIC EXERCISES: CPT

## 2023-01-19 NOTE — PROGRESS NOTES
PHYSICAL THERAPY - DAILY TREATMENT NOTE    Patient Name: Latrell Zhang. Date: 2023  : 1970   YES Patient  Verified  Visit #:     of   12  Insurance: Payor: BLUE CROSS / Plan: [employer] [facility] / Product Type: PPO /      In time: 1030 Out time: 1130   Total Treatment Time: 60     BCBS/Medicare Time Tracking (below)   Total Timed Codes (min):  60 1:1 Treatment Time:  50     TREATMENT AREA =  Left hip pain [M25.552]    SUBJECTIVE  Pain Level (on 0 to 10 scale): 1 / 10   Medication Changes/New allergies or changes in medical history, any new surgeries or procedures? NO    If yes, update Summary List   Subjective Functional Status/Changes:  []  No changes reported     Been doing really good with this hip just sore and tight,.          Modalities Rationale:     decrease edema, decrease inflammation, and decrease pain to improve patient's ability to return to pain-free ADLs   min [] Estim, type/location:                                      []  att     []  unatt     []  w/US     []  w/ice    []  w/heat    min []  Mechanical Traction: type/lbs                   []  pro   []  sup   []  int   []  cont    []  before manual    []  after manual    min []  Ultrasound, settings/location:      min []  Iontophoresis w/ dexamethasone, location:                                               []  take home patch       []  in clinic   10 min [x]  Ice     []  Heat    location/position: Supine to L hip     min []  Vasopneumatic Device, press/temp:    If using vaso (only need to measure limb vaso being performed on)      pre-treatment girth :       post-treatment girth :       measured at (landmark location) :      min []  Other:    [] Skin assessment post-treatment (if applicable):    [x]  intact    [x]  redness- no adverse reaction                  []redness - adverse reaction:        25 min Therapeutic Exercise:  [x]  See flow sheet   Rationale:      increase ROM and increase strength to improve the patients ability to return to work activities including driving/prolonged sitting     15 min Manual therapy:  [x]  Scar massage, STM TFL, gentle hip flexor stretch in SL. Rationale:      increase ROM and increase strength to improve the patients ability to return to work activities including driving/prolonged sitting     10 min Neuromuscular Re-ed: [x]  See flow sheet   Rationale:    improve balance and increase proprioception to improve the patients ability to return to walking program with = weightbearing on L LE     min Gait Training:  _100 x 2__ feet with _no__ device on level surfaces with _supervision__ level of assistance (emphasis on extension of L hip at terminal stance/preswing)   Rationale: To improve ambulation safety and efficiency in order to improve patient's ability to safely ambulate at home for self care. Billed With/As:   [] TE   [] TA   [] Neuro   [] Self Care Patient Education: [x] Review HEP    [] Progressed/Changed HEP based on:   [] positioning   [] body mechanics   [] transfers   [] heat/ice application    [] other:      Other Objective/Functional Measures:    Tightness noted within the scar region. Post Treatment Pain Level (on 0 to 10) scale:   2  / 10     ASSESSMENT  Assessment/Changes in Function:     Patient demonstrates fair gait pattern while ambulating in gym. Reports compliant with HEP. []  See Progress Note/Recertification   Patient will continue to benefit from skilled PT services to modify and progress therapeutic interventions, address functional mobility deficits, address ROM deficits, address strength deficits, analyze and address soft tissue restrictions, analyze and cue movement patterns, analyze and modify body mechanics/ergonomics, assess and modify postural abnormalities, address imbalance/dizziness, and instruct in home and community integration to attain remaining goals. Progress toward goals / Updated goals:    Short Term Goals:  To be accomplished in  2  weeks:  1) Establish HEP to prevent further disability. Progressing well 1/19/23  2) Patient will report decreased c/o pain to < or = 1-2/10 to facilitate sleeping at night uninterrupted with manageable sx in L hip.  3) Improve FOTO score from 49 points to > or = 55 points indicating improved tolerance with ADLs in regards to L hip.  4) Improve L hip flexion to > or = 100 degrees in supine so Patient will be able to put on his socks & shoes with manageable in L hip. Long Term Goals: To be accomplished in  4  weeks:  1) Improve FOTO score from 55 points to > or = 65 points indicating improved tolerance with ADLs in regards to L hip. 2) Patient to report 75% improvement in overall function in preparation for return to recreational activities with manageable sx in L hip. 3) Patient will be able to negotiate multiple steps without or with 1 HR & reciprocal pattern with no safety concerns noted. 4) Improve overall strength of L hip to 5/5 with no c/o pain upon MMT so strength is available for return to work at full duty.      PLAN  []  Upgrade activities as tolerated YES Continue plan of care   []  Discharge due to :    []  Other:      Therapist: Latricia White PTA    Date: 1/19/2023 Time: 3:07 PM     Future Appointments   Date Time Provider Jennifer Dickerson   1/19/2023 10:30 AM [facility] PT [place] [address] [facility]   1/23/2023  5:30 PM Larissa Wilson [facility]   1/25/2023  5:30 PM Brenda Gamez PT [facility]   1/30/2023  5:30 PM Brenda Gamez PT [facility]   2/1/2023  5:30 PM Matias Purdy, PT [facility]

## 2023-01-23 ENCOUNTER — HOSPITAL ENCOUNTER (OUTPATIENT)
Dept: PHYSICAL THERAPY | Age: 53
Discharge: HOME | End: 2023-01-23
Payer: COMMERCIAL

## 2023-01-23 PROCEDURE — 97110 THERAPEUTIC EXERCISES: CPT | Performed by: PHYSICAL THERAPIST

## 2023-01-23 PROCEDURE — 97116 GAIT TRAINING THERAPY: CPT | Performed by: PHYSICAL THERAPIST

## 2023-01-23 NOTE — PROGRESS NOTES
PHYSICAL THERAPY - DAILY TREATMENT NOTE    Patient Name: Matt Huang. Date: 2023  : 1970   YES Patient  Verified  Visit #:   5   of   12  Insurance: Payor: BLUE CROSS / [facility] / Product Type: PPO /      In time: 5:25 Out time: 6:25   Total Treatment Time: 55     BCBS/Medicare Time Tracking (below)   Total Timed Codes (min):  45 1:1 Treatment Time:  45     TREATMENT AREA =  Left hip pain [M25.552]    SUBJECTIVE  Pain Level (on 0 to 10 scale):  0  / 10   Medication Changes/New allergies or changes in medical history, any new surgeries or procedures? NO    If yes, update Summary List   Subjective Functional Status/Changes:  []  No changes reported     Pt reports hip is only feeling stiff, but improving each day.            Modalities Rationale:     decrease edema, decrease inflammation, and decrease pain to improve patient's ability to prevent soreness   min [] Estim, type/location:                                      []  att     []  unatt     []  w/US     []  w/ice    []  w/heat    min []  Mechanical Traction: type/lbs                   []  pro   []  sup   []  int   []  cont    []  before manual    []  after manual    min []  Ultrasound, settings/location:      min []  Iontophoresis w/ dexamethasone, location:                                               []  take home patch       []  in clinic   10 min [x]  Ice     []  Heat    location/position: Supine to L hip after session    min []  Vasopneumatic Device, press/temp:     min []  Other:    [] Skin assessment post-treatment (if applicable):    []  intact    []  redness- no adverse reaction     []redness - adverse reaction:        30 min Therapeutic Exercise:  [x]  See flow sheet   Rationale:      increase ROM, increase strength, and improve coordination to improve the patients ability to increase standing/walking tolerance          5 min Neuromuscular Re-ed: [x]  See flow sheet   Rationale:    improve coordination, improve balance, and increase proprioception to improve the patients ability to increase standing stability    10 min Gait Training:  _30__ feet with _no__ device on level surfaces with _S__ level of assistance, emphasis on L hip hip extension at terminal stance to allow stretching of L hip flexor   Rationale: To improve ambulation safety and efficiency in order to improve patient's ability to safely ambulate at home for self care. Billed With/As:   [] TE   [] TA   [] Neuro   [] Self Care Patient Education: [x] Review HEP    [] Progressed/Changed HEP based on:   [] positioning   [] body mechanics   [] transfers   [] heat/ice application    [] other:      Other Objective/Functional Measures:    Pt able to improve gait mechanics with practice/stretching     Post Treatment Pain Level (on 0 to 10) scale:   0  / 10     ASSESSMENT  Assessment/Changes in Function:     Pt had some notable fatigue in L SLS with loss of level pelvis, but he was able to correct when given VCs/using mirror for feedback. []  See Progress Note/Recertification   Patient will continue to benefit from skilled PT services to modify and progress therapeutic interventions, address functional mobility deficits, address ROM deficits, address strength deficits, analyze and address soft tissue restrictions, analyze and cue movement patterns, analyze and modify body mechanics/ergonomics, and assess and modify postural abnormalities to attain remaining goals. Progress toward goals / Updated goals:    Making gradual gains in flexibility and overall walking tolerance.      PLAN  [x]  Upgrade activities as tolerated YES Continue plan of care   []  Discharge due to :    []  Other:      Therapist: Jac Case PT    Date: 1/23/2023 Time: 11:56 AM     Future Appointments   Date Time Provider Jennifer Dickerson   1/23/2023  5:30 PM Ivana Fried [facility] [place] [facility]   1/25/2023  5:30 PM Anisha Ayala PT [place]   1/30/2023  5:30 PM Rajwinder Radha Sanchez, PT [place] [facility] [place] [facility]   2/1/2023  5:30 PM [facility]

## 2023-01-25 ENCOUNTER — APPOINTMENT (OUTPATIENT)
Dept: PHYSICAL THERAPY | Age: 53
End: 2023-01-25
Payer: COMMERCIAL

## 2023-01-25 ENCOUNTER — TELEPHONE (OUTPATIENT)
Dept: PHYSICAL THERAPY | Age: 53
End: 2023-01-25

## 2023-01-25 NOTE — PROGRESS NOTES
PHYSICAL THERAPY - DAILY TREATMENT NOTE    Patient Name: Dori Ragland. Date: 2023  : 1970   YES Patient  Verified  Visit #:      12  Insurance: Payor: Mitchell Evans / [facility]: [facility] / Product Type: PPO /      In time: *** Out time: ***   Total Treatment Time: ***     BCBS/Medicare Time Tracking (below)   Total Timed Codes (min):  *** 1:1 Treatment Time:  ***     TREATMENT AREA =  Left hip pain [M25.552]    SUBJECTIVE  Pain Level (on 0 to 10 scale):  0  / 10   Medication Changes/New allergies or changes in medical history, any new surgeries or procedures?     NO    If yes, update Summary List   Subjective Functional Status/Changes:  []  No changes reported     ***          Modalities Rationale:     decrease edema, decrease inflammation, and decrease pain to improve patient's ability to prevent soreness   min [] Estim, type/location:                                      []  att     []  unatt     []  w/US     []  w/ice    []  w/heat    min []  Mechanical Traction: type/lbs                   []  pro   []  sup   []  int   []  cont    []  before manual    []  after manual    min []  Ultrasound, settings/location:      min []  Iontophoresis w/ dexamethasone, location:                                               []  take home patch       []  in clinic   10 min [x]  Ice     []  Heat    location/position: Supine to L hip after session    min []  Vasopneumatic Device, press/temp:     min []  Other:    [] Skin assessment post-treatment (if applicable):    []  intact    []  redness- no adverse reaction     []redness - adverse reaction:        30 min Therapeutic Exercise:  [x]  See flow sheet   Rationale:      increase ROM, increase strength, and improve coordination to improve the patients ability to increase standing/walking tolerance          5 min Neuromuscular Re-ed: [x]  See flow sheet   Rationale:    improve coordination, improve balance, and increase proprioception to improve the patients ability to increase standing stability    10 min Gait Training:  _30__ feet with _no__ device on level surfaces with _S__ level of assistance, emphasis on L hip hip extension at terminal stance to allow stretching of L hip flexor   Rationale: To improve ambulation safety and efficiency in order to improve patient's ability to safely ambulate at home for self care. Billed With/As:   [] TE   [] TA   [] Neuro   [] Self Care Patient Education: [x] Review HEP    [] Progressed/Changed HEP based on:   [] positioning   [] body mechanics   [] transfers   [] heat/ice application    [] other:      Other Objective/Functional Measures:    ***     Post Treatment Pain Level (on 0 to 10) scale:   0  / 10     ASSESSMENT  Assessment/Changes in Function:     ***     []  See Progress Note/Recertification   Patient will continue to benefit from skilled PT services to modify and progress therapeutic interventions, address functional mobility deficits, address ROM deficits, address strength deficits, analyze and address soft tissue restrictions, analyze and cue movement patterns, analyze and modify body mechanics/ergonomics, and assess and modify postural abnormalities to attain remaining goals.    Progress toward goals / Updated goals:    ***     PLAN  [x]  Upgrade activities as tolerated YES Continue plan of care   []  Discharge due to :    []  Other:      Therapist: Karl Coats PT    Date: 1/25/2023 Time: 11:56 AM     Future Appointments   Date Time Provider Jennifer Dickerson   1/25/2023  5:30 PM Benja Altman [facility]   1/30/2023  5:30 PM Jens Nicholson PT [facility]   2/1/2023  5:30 PM [facility]

## 2023-01-30 ENCOUNTER — HOSPITAL ENCOUNTER (OUTPATIENT)
Dept: PHYSICAL THERAPY | Age: 53
Discharge: HOME | End: 2023-01-30
Payer: COMMERCIAL

## 2023-01-30 PROCEDURE — 97530 THERAPEUTIC ACTIVITIES: CPT | Performed by: PHYSICAL THERAPIST

## 2023-01-30 PROCEDURE — 97110 THERAPEUTIC EXERCISES: CPT | Performed by: PHYSICAL THERAPIST

## 2023-01-30 NOTE — PROGRESS NOTES
PHYSICAL THERAPY - DAILY TREATMENT NOTE    Patient Name: Tony Gregg. Date: 2023  : 1970   YES Patient  Verified  Visit #:   6   of   12  Insurance: Payor: BLUE CROSS / Plan[place] [employer] [facility] [place] [place] / Product Type: PPO /      In time: 5:25 Out time: 6:30   Total Treatment Time: 55     BCBS/Medicare Time Tracking (below)   Total Timed Codes (min):  45 1:1 Treatment Time:  45     TREATMENT AREA =  Left hip pain [M25.552]    SUBJECTIVE  Pain Level (on 0 to 10 scale):  0  / 10   Medication Changes/New allergies or changes in medical history, any new surgeries or procedures? NO    If yes, update Summary List   Subjective Functional Status/Changes:  []  No changes reported     Pt reports feeling like his swelling has significantly reduced the past couple of days.           Modalities Rationale:     decrease edema, decrease inflammation, and decrease pain to improve patient's ability to prevent soreness   min [] Estim, type/location:                                      []  att     []  unatt     []  w/US     []  w/ice    []  w/heat    min []  Mechanical Traction: type/lbs                   []  pro   []  sup   []  int   []  cont    []  before manual    []  after manual    min []  Ultrasound, settings/location:      min []  Iontophoresis w/ dexamethasone, location:                                               []  take home patch       []  in clinic   10 min [x]  Ice     []  Heat    location/position: Supine to L hip after session    min []  Vasopneumatic Device, press/temp:     min []  Other:    [] Skin assessment post-treatment (if applicable):    []  intact    []  redness- no adverse reaction     []redness - adverse reaction:        30 min Therapeutic Exercise:  [x]  See flow sheet   Rationale:      increase ROM, increase strength, and improve coordination to improve the patients ability to increase standing/walking tolerance          5 min Neuromuscular Re-ed: [x]  See flow sheet Rationale:    improve coordination, improve balance, and increase proprioception to improve the patients ability to increase standing stability    10 min Therapeutic Activities:  [x]  See Objective section (below)   Rationale:      increase ROM, increase strength, and improve coordination to improve the patients ability to increase standing/walking tolerance          Billed With/As:   [] TE   [] TA   [] Neuro   [] Self Care Patient Education: [x] Review HEP    [] Progressed/Changed HEP based on:   [] positioning   [] body mechanics   [] transfers   [] heat/ice application    [] other:      Other Objective/Functional Measures:    Pt has difficulty sitting up, crossing L leg over R knee and being able to reach his feet to put on shoes/socks. Tasks was broken into parts of supine SKC stretch followed by hooklying figure 4 L hip ER stretch. When he attempted again, he was able to reach feet more easily. Added recumbent bike    Added bridge with attempt to march R LE, but he lacks control and could only weight shift, not unweight R LE before losing level pelvis     Post Treatment Pain Level (on 0 to 10) scale:   0  / 10     ASSESSMENT  Assessment/Changes in Function:     Pt showing good tolerance to both stretching and strengthening exercises. []  See Progress Note/Recertification   Patient will continue to benefit from skilled PT services to modify and progress therapeutic interventions, address functional mobility deficits, address ROM deficits, address strength deficits, analyze and address soft tissue restrictions, analyze and cue movement patterns, analyze and modify body mechanics/ergonomics, and assess and modify postural abnormalities to attain remaining goals. Progress toward goals / Updated goals: Will continue to progress ROM exercises to achieve ADL goals.      PLAN  [x]  Upgrade activities as tolerated YES Continue plan of care   []  Discharge due to :    []  Other:      Therapist: Jr Chavira Delma Martinez, PT    Date: 1/30/2023 Time: 11:56 AM     Future Appointments   Date Time Provider Jennifer Dickerson   1/30/2023  5:30 PM Lola Ast[facility]a [facility]   2/1/2023  5:30 PM Santiago Purdy, PT [facility]

## 2023-02-01 ENCOUNTER — HOSPITAL ENCOUNTER (OUTPATIENT)
Dept: PHYSICAL THERAPY | Age: 53
Discharge: HOME | End: 2023-02-01
Payer: COMMERCIAL

## 2023-02-01 PROCEDURE — 97110 THERAPEUTIC EXERCISES: CPT

## 2023-02-01 PROCEDURE — 97112 NEUROMUSCULAR REEDUCATION: CPT | Performed by: PHYSICAL THERAPIST

## 2023-02-01 PROCEDURE — 97112 NEUROMUSCULAR REEDUCATION: CPT

## 2023-02-01 PROCEDURE — 9990 CHARGE CONVERSION

## 2023-02-01 PROCEDURE — 97110 THERAPEUTIC EXERCISES: CPT | Performed by: PHYSICAL THERAPIST

## 2023-02-01 NOTE — PROGRESS NOTES
PHYSICAL THERAPY - DAILY TREATMENT NOTE    Patient Name: Dominic Gomez. Date: 2023  : 1970   YES Patient  Verified  Visit #:   7  of   12  Insurance: Payor: BLUE CROSS / Plan: [employer] [facility] / Product Type: PPO /      In time: 5:25 Out time: 6:15   Total Treatment Time: 50     BCBS/Medicare Time Tracking (below)   Total Timed Codes (min):  40 1:1 Treatment Time:  40     TREATMENT AREA =  Left hip pain [M25.552]    SUBJECTIVE  Pain Level (on 0 to 10 scale):  0 / 10   Medication Changes/New allergies or changes in medical history, any new surgeries or procedures? NO    If yes, update Summary List   Subjective Functional Status/Changes:  []  No changes reported     Pt reports hip is still feeling better, but he has noticed that his incision area looks more red lately.           Modalities Rationale:     decrease edema, decrease inflammation, and decrease pain to improve patient's ability to prevent soreness   min [] Estim, type/location:                                      []  att     []  unatt     []  w/US     []  w/ice    []  w/heat    min []  Mechanical Traction: type/lbs                   []  pro   []  sup   []  int   []  cont    []  before manual    []  after manual    min []  Ultrasound, settings/location:      min []  Iontophoresis w/ dexamethasone, location:                                               []  take home patch       []  in clinic   10 min [x]  Ice     []  Heat    location/position: Supine to L hip after session    min []  Vasopneumatic Device, press/temp:     min []  Other:    [] Skin assessment post-treatment (if applicable):    []  intact    []  redness- no adverse reaction     []redness - adverse reaction:        25 min Therapeutic Exercise:  [x]  See flow sheet   Rationale:      increase ROM, increase strength, and improve coordination to improve the patients ability to increase standing/walking tolerance          5 min Neuromuscular Re-ed: [x]  See flow sheet   Rationale:    improve coordination, improve balance, and increase proprioception to improve the patients ability to increase standing stability    10 min Therapeutic Activities:  [x]  See Objective section (below)   Rationale:      increase ROM, increase strength, and improve coordination to improve the patients ability to increase standing/walking tolerance          Billed With/As:   [] TE   [] TA   [] Neuro   [] Self Care Patient Education: [x] Review HEP    [] Progressed/Changed HEP based on:   [] positioning   [] body mechanics   [] transfers   [] heat/ice application    [] other:      Other Objective/Functional Measures:    Pt able to sit on edge of bed and cross L foot over R knee and take off/put on sock without excessive effort after stretching today. Post Treatment Pain Level (on 0 to 10) scale:   0  / 10     ASSESSMENT  Assessment/Changes in Function:     His incision looked red at one time looking, but less so when he photographed it later in the same visit. He was encouraged to monitor for any changes and notify surgeon if he remains concerned. []  See Progress Note/Recertification   Patient will continue to benefit from skilled PT services to modify and progress therapeutic interventions, address functional mobility deficits, address ROM deficits, address strength deficits, analyze and address soft tissue restrictions, analyze and cue movement patterns, analyze and modify body mechanics/ergonomics, and assess and modify postural abnormalities to attain remaining goals. Progress toward goals / Updated goals:    Making good progress toward LTGs.      PLAN  [x]  Upgrade activities as tolerated YES Continue plan of care   []  Discharge due to :    []  Other:      Therapist: Patricia Abreu PT    Date: 2/1/2023 Time: 11:56 AM     Future Appointments   Date Time Provider Jennifer Dickerson   2/1/2023  5:30 PM Thompson Evans [facility]   2/6/2023  5:30 PM Tejal Purdy, PT [facility]

## 2023-02-06 ENCOUNTER — HOSPITAL ENCOUNTER (OUTPATIENT)
Dept: PHYSICAL THERAPY | Age: 53
Discharge: HOME | End: 2023-02-06
Payer: COMMERCIAL

## 2023-02-06 PROCEDURE — 97530 THERAPEUTIC ACTIVITIES: CPT | Performed by: PHYSICAL THERAPIST

## 2023-02-06 PROCEDURE — 97110 THERAPEUTIC EXERCISES: CPT | Performed by: PHYSICAL THERAPIST

## 2023-02-06 PROCEDURE — 97110 THERAPEUTIC EXERCISES: CPT

## 2023-02-06 PROCEDURE — 97112 NEUROMUSCULAR REEDUCATION: CPT

## 2023-02-06 PROCEDURE — 97112 NEUROMUSCULAR REEDUCATION: CPT | Performed by: PHYSICAL THERAPIST

## 2023-02-06 PROCEDURE — 9990 CHARGE CONVERSION

## 2023-02-06 PROCEDURE — 97530 THERAPEUTIC ACTIVITIES: CPT

## 2023-02-06 NOTE — PROGRESS NOTES
PHYSICAL THERAPY - DAILY TREATMENT NOTE    Patient Name: Karley Brar. Date: 2023  : 1970   YES Patient  Verified  Visit #:      12  Insurance: Payor: BLUE CROSS / [facility] [facility] / Product Type: PPO /      In time: 5:30 Out time: 6:25   Total Treatment Time: 50     BCBS/Medicare Time Tracking (below)   Total Timed Codes (min):  40 1:1 Treatment Time:  40     TREATMENT AREA =  Left hip pain [M25.552]    SUBJECTIVE  Pain Level (on 0 to 10 scale):  1-2 / 10   Medication Changes/New allergies or changes in medical history, any new surgeries or procedures? NO    If yes, update Summary List   Subjective Functional Status/Changes:  []  No changes reported     Pt reports redness in hip has improved, but he has not been stretching as much lately. He called ortho office and was told not to worry.           Modalities Rationale:     decrease edema, decrease inflammation, and decrease pain to improve patient's ability to prevent soreness   min [] Estim, type/location:                                      []  att     []  unatt     []  w/US     []  w/ice    []  w/heat    min []  Mechanical Traction: type/lbs                   []  pro   []  sup   []  int   []  cont    []  before manual    []  after manual    min []  Ultrasound, settings/location:      min []  Iontophoresis w/ dexamethasone, location:                                               []  take home patch       []  in clinic   10 min [x]  Ice     []  Heat    location/position: Supine to L hip after session    min []  Vasopneumatic Device, press/temp:     min []  Other:    [] Skin assessment post-treatment (if applicable):    []  intact    []  redness- no adverse reaction     []redness - adverse reaction:        25 min Therapeutic Exercise:  [x]  See flow sheet   Rationale:      increase ROM, increase strength, and improve coordination to improve the patients ability to increase standing/walking tolerance          5 min Neuromuscular Re-ed: [x]  See flow sheet   Rationale:    improve coordination, improve balance, and increase proprioception to improve the patients ability to increase standing stability    10 min Therapeutic Activities:  [x]  See Objective section (below)   Rationale:      increase ROM, increase strength, and improve coordination to improve the patients ability to increase standing/walking tolerance          Billed With/As:   [] TE   [] TA   [] Neuro   [] Self Care Patient Education: [x] Review HEP    [] Progressed/Changed HEP based on:   [] positioning   [] body mechanics   [] transfers   [] heat/ice application    [] other:      Other Objective/Functional Measures:    Able to reach foot/ankle in sitting, but slightly more stiff than last session     Post Treatment Pain Level (on 0 to 10) scale:   0-1  / 10     ASSESSMENT  Assessment/Changes in Function:     Pt encouraged to try and put on his own shoes and socks each morning to help increase functional ROM. []  See Progress Note/Recertification   Patient will continue to benefit from skilled PT services to modify and progress therapeutic interventions, address functional mobility deficits, address ROM deficits, address strength deficits, analyze and address soft tissue restrictions, analyze and cue movement patterns, analyze and modify body mechanics/ergonomics, and assess and modify postural abnormalities to attain remaining goals. Progress toward goals / Updated goals:    Good tolerance to less aggressive stretching L hip.      PLAN  [x]  Upgrade activities as tolerated YES Continue plan of care   []  Discharge due to :    []  Other:      Therapist: Forrest Beatty PT    Date: 2/6/2023 Time: 11:56 AM     Future Appointments   Date Time Provider Jennifer Dickerson   2/6/2023  5:30 PM Lay Sheehan PT [facility]

## 2023-02-24 ENCOUNTER — HOSPITAL ENCOUNTER (OUTPATIENT)
Facility: HOSPITAL | Age: 53
Setting detail: RECURRING SERIES
Discharge: HOME | End: 2023-02-27
Payer: COMMERCIAL

## 2023-02-24 PROCEDURE — 97530 THERAPEUTIC ACTIVITIES: CPT | Performed by: PHYSICAL THERAPIST

## 2023-02-24 PROCEDURE — 97112 NEUROMUSCULAR REEDUCATION: CPT | Performed by: PHYSICAL THERAPIST

## 2023-02-24 PROCEDURE — 97110 THERAPEUTIC EXERCISES: CPT | Performed by: PHYSICAL THERAPIST

## 2023-02-24 NOTE — PROGRESS NOTES
PHYSICAL THERAPY - DAILY TREATMENT NOTE     Patient Name: Rusty Turner Jr.    Date: 2023    : 1970  Insurance: Payor: CADEN / Plan: [employer] / Product Type: *No Product type* /      Patient  verified Yes     Visit #   Current / Total 9 12   Time   In / Out 2:00 2:45   Pain   In / Out 0 0   Subjective Functional Status/Changes: Pt reports seeing ortho and was encouraged to keep up with PT until he feels ready to transition to gym.   Changes to:  Meds, Allergies, Med Hx, Sx Hx?  If yes, update Summary List no       TREATMENT AREA = L Hip Pain    OBJECTIVE         Therapeutic Procedures:  Tx Min Billable or 1:1 Min (if diff from Tx Min) Procedure, Rationale, Specifics   25  40172 Therapeutic Exercise (timed):  increase ROM, strength, coordination, balance, and proprioception to improve patient's ability to progress to PLOF and address remaining functional goals. (see flow sheet as applicable)     Details if applicable:  L hip ROM and strength per flowsheet     10  99153 Therapeutic Activity (timed):  use of dynamic activities replicating functional movements to increase ROM, strength, coordination, balance, and proprioception in order to improve patient's ability to progress to PLOF and address remaining functional goals.  (see flow sheet as applicable)     Details if applicable:  Practiced stepping up/down on 6 inch box (lateral stepping)   10  38362 Neuromuscular Re-Education (timed):  improve balance, coordination, kinesthetic sense, posture, core stability and proprioception to improve patient's ability to develop conscious control of individual muscles and awareness of position of extremities in order to progress to PLOF and address remaining functional goals. (see flow sheet as applicable)     Details if applicable: Balance w SLS knee flexed and knee straight           Details if applicable:            Details if applicable:     45  [facility] Totals Reminder: bill using total billable min  of TIMED therapeutic procedures (example: do not include dry needle or estim unattended, both untimed codes, in totals to left)  8-22 min = 1 unit; 23-37 min = 2 units; 38-52 min = 3 units; 53-67 min = 4 units; 68-82 min = 5 units   Total Total     [x]  Patient Education billed concurrently with other procedures   [x] Review HEP    [] Progressed/Changed HEP, detail:    [] Other detail:       Objective Information/Functional Measures/Assessment    Added bent knee to SLS on foam to challenge balance and showed improvements with practice     Patient will continue to benefit from skilled PT / OT services to modify and progress therapeutic interventions, analyze and address functional mobility deficits, analyze and address ROM deficits, analyze and address strength deficits, analyze and address soft tissue restrictions, analyze and cue for proper movement patterns, and analyze and modify for postural abnormalities to address functional deficits and attain remaining goals.     Progress toward goals / Updated goals:  []  See Progress Note/Recertification    Remains limited in ability to cross L leg over R to put on socks, but motion is improving in the past few weeks    PLAN  Yes  Continue plan of care  []  Upgrade activities as tolerated  []  Discharge due to :  []  Other:    Jorge Camarillo, BRENT    2/24/2023    7:33 AM    Future Appointments   Date Time Provider Chetan Andrew   2/24/2023  2:00 PM Jorge Camarillo, [address] [facility]   3/1/2023  5:30 PM Jorge [facility]illo [facility]

## 2023-03-01 ENCOUNTER — HOSPITAL ENCOUNTER (OUTPATIENT)
Facility: HOSPITAL | Age: 53
Setting detail: RECURRING SERIES
Discharge: HOME | End: 2023-03-04
Payer: COMMERCIAL

## 2023-03-01 PROCEDURE — 97110 THERAPEUTIC EXERCISES: CPT | Performed by: PHYSICAL THERAPIST

## 2023-03-01 PROCEDURE — 97112 NEUROMUSCULAR REEDUCATION: CPT | Performed by: PHYSICAL THERAPIST

## 2023-03-01 NOTE — PROGRESS NOTES
PHYSICAL THERAPY - DAILY TREATMENT NOTE     Patient Name: Annamarie Barrtet. Date: 3/1/2023    : 1970  Insurance: Payor: [employer] / Plan: ZAHRAA [employer]GRETE FEDERAL / Product Type: *No Product type* /      Patient  verified Yes     Visit #   Current / Total 10 12   Time   In / Out 2:30 3:10   Pain   In / Out 0 0   Subjective Functional Status/Changes: Pt reports pain has bene minimal, just feeling stiff at times. Changes to:  Meds, Allergies, Med Hx, Sx Hx? If yes, update Summary List no       TREATMENT AREA = L Hip Pain    OBJECTIVE         Therapeutic Procedures: Tx Min Billable or 1:1 Min (if diff from Tx Min) Procedure, Rationale, Specifics   30 15 51208 Therapeutic Exercise (timed):  increase ROM, strength, coordination, balance, and proprioception to improve patient's ability to progress to PLOF and address remaining functional goals. (see flow sheet as applicable)     Details if applicable:  L hip ROM and strength per flowsheet       32633 Therapeutic Activity (timed):  use of dynamic activities replicating functional movements to increase ROM, strength, coordination, balance, and proprioception in order to improve patient's ability to progress to PLOF and address remaining functional goals. (see flow sheet as applicable)     Details if applicable:     10 10 56830 Neuromuscular Re-Education (timed):  improve balance, coordination, kinesthetic sense, posture, core stability and proprioception to improve patient's ability to develop conscious control of individual muscles and awareness of position of extremities in order to progress to PLOF and address remaining functional goals.  (see flow sheet as applicable)     Details if applicable: Balance w SLS knee flexed and knee straight           Details if applicable:            Details if applicable:     40 22 [facility] Totals Reminder: bill using total billable min of TIMED therapeutic procedures (example: do not include dry needle or estim unattended, both untimed codes, in totals to left)  8-22 min = 1 unit; 23-37 min = 2 units; 38-52 min = 3 units; 53-67 min = 4 units; 68-82 min = 5 units   Total Total     [x]  Patient Education billed concurrently with other procedures   [x] Review HEP    [] Progressed/Changed HEP, detail:    [] Other detail:       Objective Information/Functional Measures/Assessment    Pt used green band for sidelying hip ABD and developed some residual soreness in R posterolateral/distal thigh - symptoms resolved after riding recumbent bike    Patient will continue to benefit from skilled PT / OT services to modify and progress therapeutic interventions, analyze and address functional mobility deficits, analyze and address ROM deficits, analyze and address strength deficits, analyze and address soft tissue restrictions, analyze and cue for proper movement patterns, and analyze and modify for postural abnormalities to address functional deficits and attain remaining goals. Progress toward goals / Updated goals:  []  See Progress Note/Recertification    Pt able to reach feet more easily, but L hip remains more limited than R in achieving Domi position.     PLAN  Yes  Continue plan of care  []  Upgrade activities as tolerated  []  Discharge due to :  []  Other:    Rosmery Bowman, PT    3/1/2023    9:01 AM    Future Appointments   Date Time Provider Chetan Andrew   3/1/2023  5:30 PM Rosmery Bowman, PT [facility] [place] [facility]

## (undated) DEVICE — PACK PROCEDURE SURG TOT HIP ANTR CARTER CUST

## (undated) DEVICE — DEVICE CLOSURE SKIN SURG

## (undated) DEVICE — STRYKER PERFORMANCE SERIES SAGITTAL BLADE: Brand: STRYKER PERFORMANCE SERIES

## (undated) DEVICE — HANDPIECE SET WITH HIGH FLOW TIP AND SUCTION TUBE: Brand: INTERPULSE

## (undated) DEVICE — SUTURE VCRL + SZ 1 L36IN ABSRB UD L36MM CT-1 1/2 CIR VCP947H

## (undated) DEVICE — BLADE ELECTRODE: Brand: EDGE

## (undated) DEVICE — NDL SPNE QUINCKE 20GA 3.5IN LF --

## (undated) DEVICE — GARMENT,MEDLINE,DVT,INT,CALF,MED, GEN2: Brand: MEDLINE

## (undated) DEVICE — SUT VCRL + 2-0 36IN CT1 UD --

## (undated) DEVICE — SOLUTION IV NACL 0.9% 100 ML FLX CONTAINER

## (undated) DEVICE — SOL INJ L R 1000ML BG --

## (undated) DEVICE — SOLUTION IV 100ML 0.9% SOD CHL DIL INJ

## (undated) DEVICE — SOL IRRIGATION INJ NACL 0.9% 500ML BTL

## (undated) DEVICE — REM POLYHESIVE ADULT PATIENT RETURN ELECTRODE: Brand: VALLEYLAB

## (undated) DEVICE — WEREWOLF FASTSEAL 6.0 HEMOSTASIS WAND: Brand: FASTSEAL 6.0 HEMOSTASIS WAND

## (undated) DEVICE — DRESSING ALG W4XL8IN AG FOAM SUPERABSORBENT SIL ANTIMIC

## (undated) DEVICE — ROCKER SWITCH PENCIL HOLSTER: Brand: VALLEYLAB

## (undated) DEVICE — GRIPPER SURGICAL RETRACTOR DISP